# Patient Record
Sex: MALE | Race: WHITE | Employment: FULL TIME | URBAN - METROPOLITAN AREA
[De-identification: names, ages, dates, MRNs, and addresses within clinical notes are randomized per-mention and may not be internally consistent; named-entity substitution may affect disease eponyms.]

---

## 2017-01-04 ENCOUNTER — TELEPHONE (OUTPATIENT)
Dept: ORTHOPEDIC SURGERY | Age: 37
End: 2017-01-04

## 2017-01-05 RX ORDER — DEXTROAMPHETAMINE SACCHARATE, AMPHETAMINE ASPARTATE, DEXTROAMPHETAMINE SULFATE AND AMPHETAMINE SULFATE 7.5; 7.5; 7.5; 7.5 MG/1; MG/1; MG/1; MG/1
30 TABLET ORAL 2 TIMES DAILY
Qty: 60 TABLET | Refills: 0 | Status: SHIPPED | OUTPATIENT
Start: 2017-01-05 | End: 2017-02-16 | Stop reason: SDUPTHER

## 2017-01-09 ENCOUNTER — TELEPHONE (OUTPATIENT)
Dept: FAMILY MEDICINE CLINIC | Age: 37
End: 2017-01-09

## 2017-01-11 ENCOUNTER — TELEPHONE (OUTPATIENT)
Dept: ORTHOPEDIC SURGERY | Age: 37
End: 2017-01-11

## 2017-01-12 ENCOUNTER — OFFICE VISIT (OUTPATIENT)
Dept: FAMILY MEDICINE CLINIC | Age: 37
End: 2017-01-12

## 2017-01-12 VITALS
HEART RATE: 91 BPM | WEIGHT: 194 LBS | OXYGEN SATURATION: 98 % | BODY MASS INDEX: 28.73 KG/M2 | HEIGHT: 69 IN | DIASTOLIC BLOOD PRESSURE: 82 MMHG | SYSTOLIC BLOOD PRESSURE: 122 MMHG

## 2017-01-12 DIAGNOSIS — Z01.818 PREOPERATIVE CLEARANCE: Primary | ICD-10-CM

## 2017-01-12 PROCEDURE — 99244 OFF/OP CNSLTJ NEW/EST MOD 40: CPT | Performed by: FAMILY MEDICINE

## 2017-01-16 ENCOUNTER — TELEPHONE (OUTPATIENT)
Dept: FAMILY MEDICINE CLINIC | Age: 37
End: 2017-01-16

## 2017-01-16 RX ORDER — DIAZEPAM 10 MG/1
TABLET ORAL
Qty: 2 TABLET | Refills: 0 | Status: SHIPPED | OUTPATIENT
Start: 2017-01-16 | End: 2017-05-16 | Stop reason: ALTCHOICE

## 2017-01-17 ENCOUNTER — HOSPITAL ENCOUNTER (OUTPATIENT)
Dept: SURGERY | Age: 37
Discharge: HOME | End: 2017-01-17
Attending: ORTHOPAEDIC SURGERY | Admitting: ORTHOPAEDIC SURGERY

## 2017-01-17 VITALS
HEART RATE: 79 BPM | TEMPERATURE: 97.4 F | OXYGEN SATURATION: 100 % | RESPIRATION RATE: 19 BRPM | DIASTOLIC BLOOD PRESSURE: 98 MMHG | SYSTOLIC BLOOD PRESSURE: 145 MMHG

## 2017-01-17 RX ORDER — HYDRALAZINE HYDROCHLORIDE 20 MG/ML
5 INJECTION INTRAMUSCULAR; INTRAVENOUS EVERY 10 MIN PRN
Status: DISCONTINUED | OUTPATIENT
Start: 2017-01-17 | End: 2017-01-18 | Stop reason: HOSPADM

## 2017-01-17 RX ORDER — TRAMADOL HYDROCHLORIDE 50 MG/1
50 TABLET ORAL EVERY 6 HOURS PRN
Qty: 40 TABLET | Refills: 0 | Status: SHIPPED | OUTPATIENT
Start: 2017-01-17 | End: 2017-01-27

## 2017-01-17 RX ORDER — OXYCODONE AND ACETAMINOPHEN 7.5; 325 MG/1; MG/1
TABLET ORAL
Qty: 40 TABLET | Refills: 0 | Status: SHIPPED | OUTPATIENT
Start: 2017-01-17 | End: 2017-01-17

## 2017-01-17 RX ORDER — OXYCODONE HYDROCHLORIDE AND ACETAMINOPHEN 5; 325 MG/1; MG/1
1 TABLET ORAL PRN
Status: COMPLETED | OUTPATIENT
Start: 2017-01-17 | End: 2017-01-17

## 2017-01-17 RX ORDER — MIDAZOLAM HYDROCHLORIDE 1 MG/ML
INJECTION INTRAMUSCULAR; INTRAVENOUS
Status: DISPENSED
Start: 2017-01-17 | End: 2017-01-17

## 2017-01-17 RX ORDER — OXYCODONE HYDROCHLORIDE AND ACETAMINOPHEN 5; 325 MG/1; MG/1
2 TABLET ORAL PRN
Status: COMPLETED | OUTPATIENT
Start: 2017-01-17 | End: 2017-01-17

## 2017-01-17 RX ORDER — OXYCODONE AND ACETAMINOPHEN 7.5; 325 MG/1; MG/1
TABLET ORAL
Qty: 40 TABLET | Refills: 0 | Status: SHIPPED | OUTPATIENT
Start: 2017-01-17 | End: 2017-01-30 | Stop reason: SDUPTHER

## 2017-01-17 RX ORDER — ONDANSETRON 2 MG/ML
4 INJECTION INTRAMUSCULAR; INTRAVENOUS EVERY 10 MIN PRN
Status: DISCONTINUED | OUTPATIENT
Start: 2017-01-17 | End: 2017-01-18 | Stop reason: HOSPADM

## 2017-01-17 RX ORDER — LABETALOL HYDROCHLORIDE 5 MG/ML
5 INJECTION, SOLUTION INTRAVENOUS EVERY 10 MIN PRN
Status: DISCONTINUED | OUTPATIENT
Start: 2017-01-17 | End: 2017-01-18 | Stop reason: HOSPADM

## 2017-01-17 RX ORDER — SODIUM CHLORIDE, SODIUM LACTATE, POTASSIUM CHLORIDE, CALCIUM CHLORIDE 600; 310; 30; 20 MG/100ML; MG/100ML; MG/100ML; MG/100ML
INJECTION, SOLUTION INTRAVENOUS CONTINUOUS
Status: DISCONTINUED | OUTPATIENT
Start: 2017-01-17 | End: 2017-01-18 | Stop reason: HOSPADM

## 2017-01-17 RX ORDER — MEPERIDINE HYDROCHLORIDE 50 MG/ML
12.5 INJECTION INTRAMUSCULAR; INTRAVENOUS; SUBCUTANEOUS EVERY 5 MIN PRN
Status: DISCONTINUED | OUTPATIENT
Start: 2017-01-17 | End: 2017-01-18 | Stop reason: HOSPADM

## 2017-01-17 RX ADMIN — OXYCODONE HYDROCHLORIDE AND ACETAMINOPHEN 1 TABLET: 5; 325 TABLET ORAL at 12:39

## 2017-01-17 RX ADMIN — SODIUM CHLORIDE, SODIUM LACTATE, POTASSIUM CHLORIDE, CALCIUM CHLORIDE: 600; 310; 30; 20 INJECTION, SOLUTION INTRAVENOUS at 09:21

## 2017-01-17 ASSESSMENT — PAIN SCALES - GENERAL
PAINLEVEL_OUTOF10: 0
PAINLEVEL_OUTOF10: 4
PAINLEVEL_OUTOF10: 0

## 2017-01-20 ENCOUNTER — OFFICE VISIT (OUTPATIENT)
Dept: ORTHOPEDIC SURGERY | Age: 37
End: 2017-01-20

## 2017-01-20 VITALS — WEIGHT: 194 LBS | BODY MASS INDEX: 28.73 KG/M2 | HEIGHT: 69 IN

## 2017-01-20 DIAGNOSIS — Z98.890 HISTORY OF ARTHROSCOPY OF LEFT SHOULDER: Primary | ICD-10-CM

## 2017-01-20 PROCEDURE — 99024 POSTOP FOLLOW-UP VISIT: CPT | Performed by: ORTHOPAEDIC SURGERY

## 2017-01-30 RX ORDER — OXYCODONE AND ACETAMINOPHEN 7.5; 325 MG/1; MG/1
TABLET ORAL
Qty: 40 TABLET | Refills: 0 | Status: SHIPPED | OUTPATIENT
Start: 2017-01-30 | End: 2017-05-16 | Stop reason: ALTCHOICE

## 2017-02-03 ENCOUNTER — OFFICE VISIT (OUTPATIENT)
Dept: ORTHOPEDIC SURGERY | Age: 37
End: 2017-02-03

## 2017-02-03 DIAGNOSIS — S43.432A LABRAL TEAR OF SHOULDER, LEFT, INITIAL ENCOUNTER: Primary | ICD-10-CM

## 2017-02-03 DIAGNOSIS — Z98.890 STATUS POST SHOULDER SURGERY: ICD-10-CM

## 2017-02-03 DIAGNOSIS — M19.019 ACROMIOCLAVICULAR JOINT ARTHRITIS: ICD-10-CM

## 2017-02-03 PROCEDURE — 99024 POSTOP FOLLOW-UP VISIT: CPT | Performed by: ORTHOPAEDIC SURGERY

## 2017-02-07 ENCOUNTER — HOSPITAL ENCOUNTER (OUTPATIENT)
Dept: PHYSICAL THERAPY | Age: 37
Discharge: HOME | End: 2017-02-28
Admitting: ORTHOPAEDIC SURGERY

## 2017-02-09 ENCOUNTER — HOSPITAL ENCOUNTER (OUTPATIENT)
Dept: PHYSICAL THERAPY | Age: 37
Discharge: HOME | End: 2017-02-09
Admitting: ORTHOPAEDIC SURGERY

## 2017-02-13 ENCOUNTER — HOSPITAL ENCOUNTER (OUTPATIENT)
Dept: PHYSICAL THERAPY | Age: 37
Discharge: HOME | End: 2017-02-13
Admitting: ORTHOPAEDIC SURGERY

## 2017-02-16 ENCOUNTER — HOSPITAL ENCOUNTER (OUTPATIENT)
Dept: PHYSICAL THERAPY | Age: 37
Discharge: HOME | End: 2017-02-16
Admitting: ORTHOPAEDIC SURGERY

## 2017-02-16 RX ORDER — DEXTROAMPHETAMINE SACCHARATE, AMPHETAMINE ASPARTATE, DEXTROAMPHETAMINE SULFATE AND AMPHETAMINE SULFATE 7.5; 7.5; 7.5; 7.5 MG/1; MG/1; MG/1; MG/1
30 TABLET ORAL 2 TIMES DAILY
Qty: 60 TABLET | Refills: 0 | Status: SHIPPED | OUTPATIENT
Start: 2017-02-16 | End: 2017-02-21 | Stop reason: SDUPTHER

## 2017-02-17 ENCOUNTER — TELEPHONE (OUTPATIENT)
Dept: FAMILY MEDICINE CLINIC | Age: 37
End: 2017-02-17

## 2017-02-21 ENCOUNTER — TELEPHONE (OUTPATIENT)
Dept: FAMILY MEDICINE CLINIC | Age: 37
End: 2017-02-21

## 2017-02-21 RX ORDER — DEXTROAMPHETAMINE SACCHARATE, AMPHETAMINE ASPARTATE, DEXTROAMPHETAMINE SULFATE AND AMPHETAMINE SULFATE 7.5; 7.5; 7.5; 7.5 MG/1; MG/1; MG/1; MG/1
30 TABLET ORAL 2 TIMES DAILY
Qty: 60 TABLET | Refills: 0 | Status: SHIPPED | OUTPATIENT
Start: 2017-02-21 | End: 2017-03-22 | Stop reason: SDUPTHER

## 2017-02-23 ENCOUNTER — HOSPITAL ENCOUNTER (OUTPATIENT)
Dept: PHYSICAL THERAPY | Age: 37
Discharge: HOME | End: 2017-02-23
Admitting: ORTHOPAEDIC SURGERY

## 2017-02-24 ENCOUNTER — OFFICE VISIT (OUTPATIENT)
Dept: ORTHOPEDIC SURGERY | Age: 37
End: 2017-02-24

## 2017-02-24 DIAGNOSIS — M25.512 PAIN, JOINT, SHOULDER, LEFT: Primary | ICD-10-CM

## 2017-02-24 DIAGNOSIS — S43.432A LABRAL TEAR OF SHOULDER, LEFT, INITIAL ENCOUNTER: ICD-10-CM

## 2017-02-24 PROCEDURE — 99024 POSTOP FOLLOW-UP VISIT: CPT | Performed by: ORTHOPAEDIC SURGERY

## 2017-02-24 PROCEDURE — 73030 X-RAY EXAM OF SHOULDER: CPT | Performed by: ORTHOPAEDIC SURGERY

## 2017-03-07 ENCOUNTER — HOSPITAL ENCOUNTER (OUTPATIENT)
Dept: PHYSICAL THERAPY | Age: 37
Discharge: HOME | End: 2017-03-07
Admitting: ORTHOPAEDIC SURGERY

## 2017-03-23 ENCOUNTER — HOSPITAL ENCOUNTER (OUTPATIENT)
Dept: PHYSICAL THERAPY | Age: 37
Discharge: HOME | End: 2017-03-23
Admitting: ORTHOPAEDIC SURGERY

## 2017-03-24 ENCOUNTER — TELEPHONE (OUTPATIENT)
Dept: FAMILY MEDICINE CLINIC | Age: 37
End: 2017-03-24

## 2017-03-30 RX ORDER — DEXTROAMPHETAMINE SACCHARATE, AMPHETAMINE ASPARTATE, DEXTROAMPHETAMINE SULFATE AND AMPHETAMINE SULFATE 7.5; 7.5; 7.5; 7.5 MG/1; MG/1; MG/1; MG/1
30 TABLET ORAL 2 TIMES DAILY
Qty: 60 TABLET | Refills: 0 | Status: SHIPPED | OUTPATIENT
Start: 2017-03-30 | End: 2017-05-16 | Stop reason: SDUPTHER

## 2017-04-14 ENCOUNTER — OFFICE VISIT (OUTPATIENT)
Dept: ORTHOPEDIC SURGERY | Age: 37
End: 2017-04-14

## 2017-04-14 VITALS — BODY MASS INDEX: 28.73 KG/M2 | WEIGHT: 194 LBS | HEIGHT: 69 IN

## 2017-04-14 DIAGNOSIS — S43.432A LABRAL TEAR OF SHOULDER, LEFT, INITIAL ENCOUNTER: Primary | ICD-10-CM

## 2017-04-14 DIAGNOSIS — Z98.890 STATUS POST ARTHROSCOPY OF SHOULDER: ICD-10-CM

## 2017-04-14 PROCEDURE — 99024 POSTOP FOLLOW-UP VISIT: CPT | Performed by: ORTHOPAEDIC SURGERY

## 2017-04-27 ENCOUNTER — TELEPHONE (OUTPATIENT)
Dept: FAMILY MEDICINE CLINIC | Age: 37
End: 2017-04-27

## 2017-05-16 ENCOUNTER — OFFICE VISIT (OUTPATIENT)
Dept: INTERNAL MEDICINE CLINIC | Age: 37
End: 2017-05-16

## 2017-05-16 VITALS
WEIGHT: 191 LBS | BODY MASS INDEX: 28.95 KG/M2 | SYSTOLIC BLOOD PRESSURE: 134 MMHG | HEART RATE: 78 BPM | DIASTOLIC BLOOD PRESSURE: 86 MMHG | RESPIRATION RATE: 18 BRPM | HEIGHT: 68 IN

## 2017-05-16 DIAGNOSIS — Z79.899 CONTROLLED SUBSTANCE AGREEMENT SIGNED: ICD-10-CM

## 2017-05-16 DIAGNOSIS — H40.053 INCREASED INTRAOCULAR PRESSURE, BILATERAL: ICD-10-CM

## 2017-05-16 DIAGNOSIS — A60.00 RECURRENT GENITAL HSV (HERPES SIMPLEX VIRUS) INFECTION: ICD-10-CM

## 2017-05-16 DIAGNOSIS — Z00.00 ANNUAL PHYSICAL EXAM: Primary | ICD-10-CM

## 2017-05-16 DIAGNOSIS — F90.9 ATTENTION DEFICIT HYPERACTIVITY DISORDER (ADHD), UNSPECIFIED ADHD TYPE: ICD-10-CM

## 2017-05-16 PROCEDURE — 99203 OFFICE O/P NEW LOW 30 MIN: CPT | Performed by: INTERNAL MEDICINE

## 2017-05-16 PROCEDURE — 99385 PREV VISIT NEW AGE 18-39: CPT | Performed by: INTERNAL MEDICINE

## 2017-05-16 RX ORDER — DEXTROAMPHETAMINE SACCHARATE, AMPHETAMINE ASPARTATE, DEXTROAMPHETAMINE SULFATE AND AMPHETAMINE SULFATE 7.5; 7.5; 7.5; 7.5 MG/1; MG/1; MG/1; MG/1
30 TABLET ORAL 2 TIMES DAILY
Qty: 60 TABLET | Refills: 0 | Status: SHIPPED | OUTPATIENT
Start: 2017-06-16 | End: 2017-08-15 | Stop reason: SDUPTHER

## 2017-05-16 RX ORDER — DEXTROAMPHETAMINE SACCHARATE, AMPHETAMINE ASPARTATE, DEXTROAMPHETAMINE SULFATE AND AMPHETAMINE SULFATE 7.5; 7.5; 7.5; 7.5 MG/1; MG/1; MG/1; MG/1
30 TABLET ORAL 2 TIMES DAILY
Qty: 60 TABLET | Refills: 0 | Status: SHIPPED | OUTPATIENT
Start: 2017-05-16 | End: 2017-08-15 | Stop reason: SDUPTHER

## 2017-05-16 RX ORDER — DEXTROAMPHETAMINE SACCHARATE, AMPHETAMINE ASPARTATE, DEXTROAMPHETAMINE SULFATE AND AMPHETAMINE SULFATE 7.5; 7.5; 7.5; 7.5 MG/1; MG/1; MG/1; MG/1
30 TABLET ORAL 2 TIMES DAILY
Qty: 60 TABLET | Refills: 0 | Status: SHIPPED | OUTPATIENT
Start: 2017-07-16 | End: 2017-08-15 | Stop reason: SDUPTHER

## 2017-06-05 ENCOUNTER — OFFICE VISIT (OUTPATIENT)
Dept: ORTHOPEDIC SURGERY | Age: 37
End: 2017-06-05

## 2017-06-05 VITALS — WEIGHT: 190.92 LBS | BODY MASS INDEX: 28.94 KG/M2 | HEIGHT: 68 IN

## 2017-06-05 DIAGNOSIS — M76.891 HIP ABDUCTOR TENDINITIS, RIGHT: ICD-10-CM

## 2017-06-05 DIAGNOSIS — M25.551 RIGHT HIP PAIN: Primary | ICD-10-CM

## 2017-06-05 PROCEDURE — 99213 OFFICE O/P EST LOW 20 MIN: CPT | Performed by: ORTHOPAEDIC SURGERY

## 2017-06-05 PROCEDURE — 73502 X-RAY EXAM HIP UNI 2-3 VIEWS: CPT | Performed by: ORTHOPAEDIC SURGERY

## 2017-07-21 ENCOUNTER — HOSPITAL ENCOUNTER (OUTPATIENT)
Dept: GENERAL RADIOLOGY | Age: 37
Discharge: HOME | End: 2017-07-21

## 2017-07-21 LAB
A/G RATIO: 1.8 (ref 1.1–2.2)
ALBUMIN SERPL-MCNC: 4.6 G/DL (ref 3.4–5)
ALP BLD-CCNC: 78 U/L (ref 40–129)
ALT SERPL-CCNC: 25 U/L (ref 10–40)
ANION GAP SERPL CALCULATED.3IONS-SCNC: 13 MMOL/L (ref 3–16)
AST SERPL-CCNC: 27 U/L (ref 15–37)
BASOPHILS ABSOLUTE: 0 K/UL (ref 0–0.2)
BASOPHILS RELATIVE PERCENT: 0.6 %
BILIRUB SERPL-MCNC: 0.6 MG/DL (ref 0–1)
BUN BLDV-MCNC: 18 MG/DL (ref 7–20)
CALCIUM SERPL-MCNC: 9.6 MG/DL (ref 8.3–10.6)
CHLORIDE BLD-SCNC: 103 MMOL/L (ref 99–110)
CHOLESTEROL, TOTAL: 125 MG/DL (ref 0–199)
CO2: 26 MMOL/L (ref 21–32)
CREAT SERPL-MCNC: 1.2 MG/DL (ref 0.9–1.3)
EOSINOPHILS ABSOLUTE: 0.1 K/UL (ref 0–0.6)
EOSINOPHILS RELATIVE PERCENT: 2 %
GFR AFRICAN AMERICAN: >60
GFR NON-AFRICAN AMERICAN: >60
GLOBULIN: 2.5 G/DL
GLUCOSE BLD-MCNC: 76 MG/DL (ref 70–99)
HCT VFR BLD CALC: 46.8 % (ref 40.5–52.5)
HDLC SERPL-MCNC: 37 MG/DL (ref 40–60)
HEMOGLOBIN: 16 G/DL (ref 13.5–17.5)
LDL CHOLESTEROL CALCULATED: 73 MG/DL
LYMPHOCYTES ABSOLUTE: 1.3 K/UL (ref 1–5.1)
LYMPHOCYTES RELATIVE PERCENT: 28.8 %
MCH RBC QN AUTO: 32.5 PG (ref 26–34)
MCHC RBC AUTO-ENTMCNC: 34.3 G/DL (ref 31–36)
MCV RBC AUTO: 94.7 FL (ref 80–100)
MONOCYTES ABSOLUTE: 0.4 K/UL (ref 0–1.3)
MONOCYTES RELATIVE PERCENT: 9.1 %
NEUTROPHILS ABSOLUTE: 2.7 K/UL (ref 1.7–7.7)
NEUTROPHILS RELATIVE PERCENT: 59.5 %
PDW BLD-RTO: 13.5 % (ref 12.4–15.4)
PLATELET # BLD: 189 K/UL (ref 135–450)
PMV BLD AUTO: 9.1 FL (ref 5–10.5)
POTASSIUM SERPL-SCNC: 4.7 MMOL/L (ref 3.5–5.1)
RBC # BLD: 4.94 M/UL (ref 4.2–5.9)
SODIUM BLD-SCNC: 142 MMOL/L (ref 136–145)
TOTAL PROTEIN: 7.1 G/DL (ref 6.4–8.2)
TRIGL SERPL-MCNC: 77 MG/DL (ref 0–150)
VLDLC SERPL CALC-MCNC: 15 MG/DL
WBC # BLD: 4.6 K/UL (ref 4–11)

## 2017-08-15 ENCOUNTER — OFFICE VISIT (OUTPATIENT)
Dept: INTERNAL MEDICINE CLINIC | Age: 37
End: 2017-08-15

## 2017-08-15 VITALS
WEIGHT: 184 LBS | BODY MASS INDEX: 27.89 KG/M2 | SYSTOLIC BLOOD PRESSURE: 115 MMHG | RESPIRATION RATE: 16 BRPM | HEART RATE: 78 BPM | HEIGHT: 68 IN | DIASTOLIC BLOOD PRESSURE: 80 MMHG

## 2017-08-15 DIAGNOSIS — Z00.00 ANNUAL PHYSICAL EXAM: ICD-10-CM

## 2017-08-15 DIAGNOSIS — F90.9 ATTENTION DEFICIT HYPERACTIVITY DISORDER (ADHD), UNSPECIFIED ADHD TYPE: ICD-10-CM

## 2017-08-15 DIAGNOSIS — R53.83 FATIGUE, UNSPECIFIED TYPE: Primary | ICD-10-CM

## 2017-08-15 DIAGNOSIS — H40.053 INCREASED INTRAOCULAR PRESSURE, BILATERAL: ICD-10-CM

## 2017-08-15 LAB — TSH REFLEX: 1.4 UIU/ML (ref 0.27–4.2)

## 2017-08-15 PROCEDURE — 36415 COLL VENOUS BLD VENIPUNCTURE: CPT | Performed by: INTERNAL MEDICINE

## 2017-08-15 PROCEDURE — 99214 OFFICE O/P EST MOD 30 MIN: CPT | Performed by: INTERNAL MEDICINE

## 2017-08-15 PROCEDURE — 93000 ELECTROCARDIOGRAM COMPLETE: CPT | Performed by: INTERNAL MEDICINE

## 2017-08-15 RX ORDER — DEXTROAMPHETAMINE SACCHARATE, AMPHETAMINE ASPARTATE, DEXTROAMPHETAMINE SULFATE AND AMPHETAMINE SULFATE 7.5; 7.5; 7.5; 7.5 MG/1; MG/1; MG/1; MG/1
30 TABLET ORAL 2 TIMES DAILY
Qty: 60 TABLET | Refills: 0 | Status: SHIPPED | OUTPATIENT
Start: 2017-10-16 | End: 2017-11-20 | Stop reason: SDUPTHER

## 2017-08-15 RX ORDER — DEXTROAMPHETAMINE SACCHARATE, AMPHETAMINE ASPARTATE, DEXTROAMPHETAMINE SULFATE AND AMPHETAMINE SULFATE 7.5; 7.5; 7.5; 7.5 MG/1; MG/1; MG/1; MG/1
30 TABLET ORAL 2 TIMES DAILY
Qty: 60 TABLET | Refills: 0 | Status: SHIPPED | OUTPATIENT
Start: 2017-08-15 | End: 2017-11-20 | Stop reason: SDUPTHER

## 2017-08-15 RX ORDER — DEXTROAMPHETAMINE SACCHARATE, AMPHETAMINE ASPARTATE, DEXTROAMPHETAMINE SULFATE AND AMPHETAMINE SULFATE 7.5; 7.5; 7.5; 7.5 MG/1; MG/1; MG/1; MG/1
30 TABLET ORAL 2 TIMES DAILY
Qty: 60 TABLET | Refills: 0 | Status: SHIPPED | OUTPATIENT
Start: 2017-09-16 | End: 2017-11-20 | Stop reason: SDUPTHER

## 2017-08-15 RX ORDER — LATANOPROST 50 UG/ML
1 SOLUTION/ DROPS OPHTHALMIC NIGHTLY
Qty: 1 BOTTLE | Refills: 0 | Status: SHIPPED | OUTPATIENT
Start: 2017-08-15 | End: 2018-11-15 | Stop reason: ALTCHOICE

## 2017-08-17 DIAGNOSIS — E29.1 HYPOGONADISM IN MALE: ICD-10-CM

## 2017-08-17 LAB — TESTOSTERONE TOTAL: 262 NG/DL (ref 220–1000)

## 2017-08-31 ENCOUNTER — HOSPITAL ENCOUNTER (OUTPATIENT)
Dept: GENERAL RADIOLOGY | Age: 37
Discharge: HOME | End: 2017-08-31

## 2017-09-02 LAB
SEX HORMONE BINDING GLOBULIN: 14 NMOL/L (ref 11–80)
TESTOSTERONE FREE PERCENT: 2.5 % (ref 1.6–2.9)
TESTOSTERONE FREE, CALC: 59 PG/ML (ref 47–244)
TESTOSTERONE TOTAL-MALE: 238 NG/DL (ref 300–1080)
TESTOSTERONE, BIOAVAILABLE: 169 NG/DL (ref 131–682)

## 2017-09-05 DIAGNOSIS — E29.1 HYPOGONADISM IN MALE: Primary | ICD-10-CM

## 2017-09-05 RX ORDER — TESTOSTERONE 16.2 MG/G
2 GEL TRANSDERMAL DAILY
Qty: 1 BOTTLE | Refills: 0 | Status: SHIPPED | OUTPATIENT
Start: 2017-09-05 | End: 2018-02-26 | Stop reason: ALTCHOICE

## 2017-11-20 ENCOUNTER — OFFICE VISIT (OUTPATIENT)
Dept: INTERNAL MEDICINE CLINIC | Age: 37
End: 2017-11-20

## 2017-11-20 VITALS
DIASTOLIC BLOOD PRESSURE: 82 MMHG | RESPIRATION RATE: 16 BRPM | BODY MASS INDEX: 29.7 KG/M2 | HEART RATE: 80 BPM | WEIGHT: 196 LBS | HEIGHT: 68 IN | SYSTOLIC BLOOD PRESSURE: 130 MMHG

## 2017-11-20 DIAGNOSIS — F98.8 ATTENTION DEFICIT DISORDER (ADD) WITHOUT HYPERACTIVITY: ICD-10-CM

## 2017-11-20 PROCEDURE — G8484 FLU IMMUNIZE NO ADMIN: HCPCS | Performed by: INTERNAL MEDICINE

## 2017-11-20 PROCEDURE — 1036F TOBACCO NON-USER: CPT | Performed by: INTERNAL MEDICINE

## 2017-11-20 PROCEDURE — G8427 DOCREV CUR MEDS BY ELIG CLIN: HCPCS | Performed by: INTERNAL MEDICINE

## 2017-11-20 PROCEDURE — G8417 CALC BMI ABV UP PARAM F/U: HCPCS | Performed by: INTERNAL MEDICINE

## 2017-11-20 PROCEDURE — 99213 OFFICE O/P EST LOW 20 MIN: CPT | Performed by: INTERNAL MEDICINE

## 2017-11-20 RX ORDER — DEXTROAMPHETAMINE SACCHARATE, AMPHETAMINE ASPARTATE, DEXTROAMPHETAMINE SULFATE AND AMPHETAMINE SULFATE 7.5; 7.5; 7.5; 7.5 MG/1; MG/1; MG/1; MG/1
30 TABLET ORAL 2 TIMES DAILY
Qty: 60 TABLET | Refills: 0 | Status: SHIPPED | OUTPATIENT
Start: 2017-12-20 | End: 2018-02-26 | Stop reason: SDUPTHER

## 2017-11-20 RX ORDER — DEXTROAMPHETAMINE SACCHARATE, AMPHETAMINE ASPARTATE, DEXTROAMPHETAMINE SULFATE AND AMPHETAMINE SULFATE 7.5; 7.5; 7.5; 7.5 MG/1; MG/1; MG/1; MG/1
30 TABLET ORAL 2 TIMES DAILY
Qty: 60 TABLET | Refills: 0 | Status: SHIPPED | OUTPATIENT
Start: 2017-11-20 | End: 2018-02-26 | Stop reason: SDUPTHER

## 2017-11-20 RX ORDER — DEXTROAMPHETAMINE SACCHARATE, AMPHETAMINE ASPARTATE, DEXTROAMPHETAMINE SULFATE AND AMPHETAMINE SULFATE 7.5; 7.5; 7.5; 7.5 MG/1; MG/1; MG/1; MG/1
30 TABLET ORAL 2 TIMES DAILY
Qty: 60 TABLET | Refills: 0 | Status: SHIPPED | OUTPATIENT
Start: 2018-01-20 | End: 2018-02-26 | Stop reason: SDUPTHER

## 2018-01-17 ENCOUNTER — OFFICE VISIT (OUTPATIENT)
Dept: INTERNAL MEDICINE CLINIC | Age: 38
End: 2018-01-17

## 2018-01-17 VITALS
HEIGHT: 68 IN | BODY MASS INDEX: 28.95 KG/M2 | HEART RATE: 82 BPM | DIASTOLIC BLOOD PRESSURE: 84 MMHG | RESPIRATION RATE: 18 BRPM | TEMPERATURE: 99.9 F | SYSTOLIC BLOOD PRESSURE: 140 MMHG | WEIGHT: 191 LBS

## 2018-01-17 DIAGNOSIS — J40 BRONCHITIS: Primary | ICD-10-CM

## 2018-01-17 DIAGNOSIS — J10.1 INFLUENZA B: ICD-10-CM

## 2018-01-17 DIAGNOSIS — R50.9 FEVER AND CHILLS: ICD-10-CM

## 2018-01-17 DIAGNOSIS — M79.10 MYALGIA: ICD-10-CM

## 2018-01-17 LAB
INFLUENZA A ANTIBODY: NEGATIVE
INFLUENZA B ANTIBODY: POSITIVE

## 2018-01-17 PROCEDURE — G8484 FLU IMMUNIZE NO ADMIN: HCPCS | Performed by: INTERNAL MEDICINE

## 2018-01-17 PROCEDURE — 87804 INFLUENZA ASSAY W/OPTIC: CPT | Performed by: INTERNAL MEDICINE

## 2018-01-17 PROCEDURE — G8427 DOCREV CUR MEDS BY ELIG CLIN: HCPCS | Performed by: INTERNAL MEDICINE

## 2018-01-17 PROCEDURE — 99213 OFFICE O/P EST LOW 20 MIN: CPT | Performed by: INTERNAL MEDICINE

## 2018-01-17 PROCEDURE — 1036F TOBACCO NON-USER: CPT | Performed by: INTERNAL MEDICINE

## 2018-01-17 PROCEDURE — G8417 CALC BMI ABV UP PARAM F/U: HCPCS | Performed by: INTERNAL MEDICINE

## 2018-01-17 RX ORDER — LEVOFLOXACIN 500 MG/1
500 TABLET, FILM COATED ORAL DAILY
Qty: 10 TABLET | Refills: 0 | Status: SHIPPED | OUTPATIENT
Start: 2018-01-17 | End: 2018-01-27

## 2018-01-17 RX ORDER — OSELTAMIVIR PHOSPHATE 75 MG/1
75 CAPSULE ORAL 2 TIMES DAILY
Qty: 10 CAPSULE | Refills: 0 | Status: SHIPPED | OUTPATIENT
Start: 2018-01-17 | End: 2018-01-22

## 2018-01-29 ENCOUNTER — TELEPHONE (OUTPATIENT)
Dept: INTERNAL MEDICINE CLINIC | Age: 38
End: 2018-01-29

## 2018-02-26 ENCOUNTER — OFFICE VISIT (OUTPATIENT)
Dept: INTERNAL MEDICINE CLINIC | Age: 38
End: 2018-02-26

## 2018-02-26 VITALS
BODY MASS INDEX: 29.7 KG/M2 | WEIGHT: 196 LBS | DIASTOLIC BLOOD PRESSURE: 80 MMHG | HEIGHT: 68 IN | SYSTOLIC BLOOD PRESSURE: 132 MMHG | HEART RATE: 68 BPM | RESPIRATION RATE: 18 BRPM

## 2018-02-26 DIAGNOSIS — F98.8 ATTENTION DEFICIT DISORDER (ADD) WITHOUT HYPERACTIVITY: ICD-10-CM

## 2018-02-26 PROCEDURE — G8484 FLU IMMUNIZE NO ADMIN: HCPCS | Performed by: INTERNAL MEDICINE

## 2018-02-26 PROCEDURE — 1036F TOBACCO NON-USER: CPT | Performed by: INTERNAL MEDICINE

## 2018-02-26 PROCEDURE — 99213 OFFICE O/P EST LOW 20 MIN: CPT | Performed by: INTERNAL MEDICINE

## 2018-02-26 PROCEDURE — G8427 DOCREV CUR MEDS BY ELIG CLIN: HCPCS | Performed by: INTERNAL MEDICINE

## 2018-02-26 PROCEDURE — G8417 CALC BMI ABV UP PARAM F/U: HCPCS | Performed by: INTERNAL MEDICINE

## 2018-02-26 RX ORDER — DEXTROAMPHETAMINE SACCHARATE, AMPHETAMINE ASPARTATE, DEXTROAMPHETAMINE SULFATE AND AMPHETAMINE SULFATE 7.5; 7.5; 7.5; 7.5 MG/1; MG/1; MG/1; MG/1
30 TABLET ORAL 2 TIMES DAILY
Qty: 60 TABLET | Refills: 0 | Status: SHIPPED | OUTPATIENT
Start: 2018-03-26 | End: 2018-05-31 | Stop reason: SDUPTHER

## 2018-02-26 RX ORDER — DEXTROAMPHETAMINE SACCHARATE, AMPHETAMINE ASPARTATE, DEXTROAMPHETAMINE SULFATE AND AMPHETAMINE SULFATE 7.5; 7.5; 7.5; 7.5 MG/1; MG/1; MG/1; MG/1
30 TABLET ORAL 2 TIMES DAILY
Qty: 60 TABLET | Refills: 0 | Status: SHIPPED | OUTPATIENT
Start: 2018-02-26 | End: 2018-05-31 | Stop reason: SDUPTHER

## 2018-02-26 RX ORDER — DEXTROAMPHETAMINE SACCHARATE, AMPHETAMINE ASPARTATE, DEXTROAMPHETAMINE SULFATE AND AMPHETAMINE SULFATE 7.5; 7.5; 7.5; 7.5 MG/1; MG/1; MG/1; MG/1
30 TABLET ORAL 2 TIMES DAILY
Qty: 60 TABLET | Refills: 0 | Status: SHIPPED | OUTPATIENT
Start: 2018-04-26 | End: 2018-05-31 | Stop reason: SDUPTHER

## 2018-02-26 NOTE — PROGRESS NOTES
Nahomy Tobin  YOB: 1980    Date of Service:  2/26/2018    Chief Complaint:      Chief Complaint   Patient presents with    3 Month Follow-Up      ADD       HPI:  Giovana Koo is a 41 y.o. ADD:  Stable on Adderall 30 mg bid days 5-7 days a week.  No change in appetite or sleep disturbance. No results found for: Gonsalo Contreras  Lab Results   Component Value Date     07/21/2017    K 4.7 07/21/2017     07/21/2017    CO2 26 07/21/2017    BUN 18 07/21/2017    CREATININE 1.2 07/21/2017    GLUCOSE 76 07/21/2017    CALCIUM 9.6 07/21/2017     Lab Results   Component Value Date    CHOL 125 07/21/2017    TRIG 77 07/21/2017    HDL 37 07/21/2017    LDLCALC 73 07/21/2017     Lab Results   Component Value Date    ALT 25 07/21/2017    AST 27 07/21/2017     Lab Results   Component Value Date    TSH 1.00 12/11/2013    T4FREE 1.1 12/11/2013     Lab Results   Component Value Date    WBC 4.6 07/21/2017    HGB 16.0 07/21/2017    HCT 46.8 07/21/2017    MCV 94.7 07/21/2017     07/21/2017     No results found for: INR   No results found for: PSA   No results found for: [address]     Patient Active Problem List   Diagnosis    Increased intraocular pressure    Attention deficit disorder (ADD) without hyperactivity    Recurrent genital HSV (herpes simplex virus) infection    Controlled substance agreement signed    Hypogonadism in male       No Known Allergies  Outpatient Prescriptions Marked as Taking for the 2/26/18 encounter (Office Visit) with Maury Engel MD   Medication Sig Dispense Refill    amphetamine-dextroamphetamine (ADDERALL) 30 MG tablet Take 1 tablet by mouth 2 times daily . 60 tablet 0    amphetamine-dextroamphetamine (ADDERALL, 30MG,) 30 MG tablet Take 1 tablet by mouth 2 times daily . 60 tablet 0    amphetamine-dextroamphetamine (ADDERALL, 30MG,) 30 MG tablet Take 1 tablet by mouth 2 times daily .  60 tablet 0    latanoprost (XALATAN) 0.005 % ophthalmic solution Place 1 drop into both eyes nightly 1 Bottle 0    acyclovir (ZOVIRAX) 400 MG tablet TAKE 1 TABLET BY MOUTH TWICE DAILY (Patient taking differently: TAKE 1 TABLET BY MOUTH TWICE DAILY prn) 60 tablet 3    dorzolamide (TRUSOPT) 2 % ophthalmic solution Place 2 drops into both eyes 2 times daily           Review of Systems: 14 systems were negative except of what was stated on HPI    Nursing note and vitals reviewed. Vitals:    02/26/18 0902   BP: 132/80   Pulse: 68   Resp: 18   Weight: 196 lb (88.9 kg)   Height: 5' 8\" (1.727 m)     Wt Readings from Last 3 Encounters:   02/26/18 196 lb (88.9 kg)   01/17/18 191 lb (86.6 kg)   11/20/17 196 lb (88.9 kg)     BP Readings from Last 3 Encounters:   02/26/18 132/80   01/17/18 (!) 140/84   11/20/17 130/82     Body mass index is 29.8 kg/m². Constitutional: Patient appears well-developed and well-nourished. No distress. Head: Normocephalic and atraumatic. Neck: Normal range of motion. Neck supple. No thyroidmegaly. Cardiovascular: Normal rate, regular rhythm, normal heart sounds and intact distal pulses. Pulmonary/Chest: Effort normal and breath sounds normal. No stridor. No respiratory distress. No wheezes and no rales. Abdominal: Soft. Bowel sounds are normal. No distension and no mass. No tenderness. No rebound and no guarding. Musculoskeletal: No edema and no tenderness. Skin: No rash or erythema. Psychiatric: Normal mood and affect. Behavior is normal.     Assessment/Plan:  Edi Klein was seen today for 3 month follow-up. Diagnoses and all orders for this visit:    Attention deficit disorder (ADD) without hyperactivity  -     amphetamine-dextroamphetamine (ADDERALL) 30 MG tablet; Take 1 tablet by mouth 2 times daily for 30 days. -     amphetamine-dextroamphetamine (ADDERALL, 30MG,) 30 MG tablet; Take 1 tablet by mouth 2 times daily for 30 days. -     amphetamine-dextroamphetamine (ADDERALL, 30MG,) 30 MG tablet;  Take 1 tablet by mouth 2 times daily for 30

## 2018-05-31 ENCOUNTER — OFFICE VISIT (OUTPATIENT)
Dept: INTERNAL MEDICINE CLINIC | Age: 38
End: 2018-05-31

## 2018-05-31 VITALS
HEIGHT: 68 IN | DIASTOLIC BLOOD PRESSURE: 84 MMHG | BODY MASS INDEX: 28.49 KG/M2 | SYSTOLIC BLOOD PRESSURE: 138 MMHG | HEART RATE: 72 BPM | RESPIRATION RATE: 16 BRPM | WEIGHT: 188 LBS

## 2018-05-31 DIAGNOSIS — Z00.00 ANNUAL PHYSICAL EXAM: Primary | ICD-10-CM

## 2018-05-31 DIAGNOSIS — F98.8 ATTENTION DEFICIT DISORDER (ADD) WITHOUT HYPERACTIVITY: ICD-10-CM

## 2018-05-31 DIAGNOSIS — E29.1 HYPOGONADISM IN MALE: ICD-10-CM

## 2018-05-31 PROCEDURE — 99395 PREV VISIT EST AGE 18-39: CPT | Performed by: INTERNAL MEDICINE

## 2018-05-31 RX ORDER — DEXTROAMPHETAMINE SACCHARATE, AMPHETAMINE ASPARTATE, DEXTROAMPHETAMINE SULFATE AND AMPHETAMINE SULFATE 7.5; 7.5; 7.5; 7.5 MG/1; MG/1; MG/1; MG/1
30 TABLET ORAL 2 TIMES DAILY
Qty: 60 TABLET | Refills: 0 | Status: SHIPPED | OUTPATIENT
Start: 2018-07-01 | End: 2018-08-29 | Stop reason: SDUPTHER

## 2018-05-31 RX ORDER — DEXTROAMPHETAMINE SACCHARATE, AMPHETAMINE ASPARTATE, DEXTROAMPHETAMINE SULFATE AND AMPHETAMINE SULFATE 7.5; 7.5; 7.5; 7.5 MG/1; MG/1; MG/1; MG/1
30 TABLET ORAL 2 TIMES DAILY
Qty: 60 TABLET | Refills: 0 | Status: SHIPPED | OUTPATIENT
Start: 2018-05-31 | End: 2018-08-29 | Stop reason: SDUPTHER

## 2018-05-31 RX ORDER — DEXTROAMPHETAMINE SACCHARATE, AMPHETAMINE ASPARTATE, DEXTROAMPHETAMINE SULFATE AND AMPHETAMINE SULFATE 7.5; 7.5; 7.5; 7.5 MG/1; MG/1; MG/1; MG/1
30 TABLET ORAL 2 TIMES DAILY
Qty: 60 TABLET | Refills: 0 | Status: SHIPPED | OUTPATIENT
Start: 2018-08-01 | End: 2018-08-29 | Stop reason: SDUPTHER

## 2018-08-29 ENCOUNTER — OFFICE VISIT (OUTPATIENT)
Dept: INTERNAL MEDICINE CLINIC | Age: 38
End: 2018-08-29

## 2018-08-29 VITALS
OXYGEN SATURATION: 98 % | HEART RATE: 84 BPM | DIASTOLIC BLOOD PRESSURE: 80 MMHG | BODY MASS INDEX: 28.34 KG/M2 | SYSTOLIC BLOOD PRESSURE: 130 MMHG | WEIGHT: 186.4 LBS

## 2018-08-29 DIAGNOSIS — F98.8 ATTENTION DEFICIT DISORDER (ADD) WITHOUT HYPERACTIVITY: Primary | ICD-10-CM

## 2018-08-29 DIAGNOSIS — Z79.899 CONTROLLED SUBSTANCE AGREEMENT SIGNED: ICD-10-CM

## 2018-08-29 PROCEDURE — G8427 DOCREV CUR MEDS BY ELIG CLIN: HCPCS | Performed by: INTERNAL MEDICINE

## 2018-08-29 PROCEDURE — 1036F TOBACCO NON-USER: CPT | Performed by: INTERNAL MEDICINE

## 2018-08-29 PROCEDURE — G8417 CALC BMI ABV UP PARAM F/U: HCPCS | Performed by: INTERNAL MEDICINE

## 2018-08-29 PROCEDURE — 99213 OFFICE O/P EST LOW 20 MIN: CPT | Performed by: INTERNAL MEDICINE

## 2018-08-29 RX ORDER — DEXTROAMPHETAMINE SACCHARATE, AMPHETAMINE ASPARTATE, DEXTROAMPHETAMINE SULFATE AND AMPHETAMINE SULFATE 7.5; 7.5; 7.5; 7.5 MG/1; MG/1; MG/1; MG/1
30 TABLET ORAL 2 TIMES DAILY
Qty: 60 TABLET | Refills: 0 | Status: SHIPPED | OUTPATIENT
Start: 2018-09-01 | End: 2018-11-29 | Stop reason: SDUPTHER

## 2018-08-29 RX ORDER — DEXTROAMPHETAMINE SACCHARATE, AMPHETAMINE ASPARTATE, DEXTROAMPHETAMINE SULFATE AND AMPHETAMINE SULFATE 7.5; 7.5; 7.5; 7.5 MG/1; MG/1; MG/1; MG/1
30 TABLET ORAL 2 TIMES DAILY
Qty: 60 TABLET | Refills: 0 | Status: SHIPPED | OUTPATIENT
Start: 2018-11-01 | End: 2018-11-29 | Stop reason: SDUPTHER

## 2018-08-29 RX ORDER — DEXTROAMPHETAMINE SACCHARATE, AMPHETAMINE ASPARTATE, DEXTROAMPHETAMINE SULFATE AND AMPHETAMINE SULFATE 7.5; 7.5; 7.5; 7.5 MG/1; MG/1; MG/1; MG/1
30 TABLET ORAL 2 TIMES DAILY
Qty: 60 TABLET | Refills: 0 | Status: SHIPPED | OUTPATIENT
Start: 2018-10-01 | End: 2018-11-29 | Stop reason: SDUPTHER

## 2018-08-29 ASSESSMENT — PATIENT HEALTH QUESTIONNAIRE - PHQ9
2. FEELING DOWN, DEPRESSED OR HOPELESS: 0
SUM OF ALL RESPONSES TO PHQ9 QUESTIONS 1 & 2: 0
1. LITTLE INTEREST OR PLEASURE IN DOING THINGS: 0
SUM OF ALL RESPONSES TO PHQ QUESTIONS 1-9: 0
SUM OF ALL RESPONSES TO PHQ QUESTIONS 1-9: 0

## 2018-08-29 NOTE — PROGRESS NOTES
1 tablet by mouth 2 times daily for 30 days. .  -     amphetamine-dextroamphetamine (ADDERALL, 30MG,) 30 MG tablet; Take 1 tablet by mouth 2 times daily for 30 days. .  -     amphetamine-dextroamphetamine (ADDERALL, 30MG,) 30 MG tablet; Take 1 tablet by mouth 2 times daily for 30 days. .        Return 3 months ADD.

## 2018-11-15 ENCOUNTER — OFFICE VISIT (OUTPATIENT)
Dept: INTERNAL MEDICINE CLINIC | Age: 38
End: 2018-11-15
Payer: COMMERCIAL

## 2018-11-15 VITALS
HEART RATE: 79 BPM | BODY MASS INDEX: 28.59 KG/M2 | SYSTOLIC BLOOD PRESSURE: 102 MMHG | DIASTOLIC BLOOD PRESSURE: 64 MMHG | OXYGEN SATURATION: 98 % | TEMPERATURE: 98.4 F | WEIGHT: 188 LBS

## 2018-11-15 DIAGNOSIS — J06.9 URI, ACUTE: Primary | ICD-10-CM

## 2018-11-15 PROCEDURE — G8417 CALC BMI ABV UP PARAM F/U: HCPCS | Performed by: INTERNAL MEDICINE

## 2018-11-15 PROCEDURE — G8484 FLU IMMUNIZE NO ADMIN: HCPCS | Performed by: INTERNAL MEDICINE

## 2018-11-15 PROCEDURE — 99213 OFFICE O/P EST LOW 20 MIN: CPT | Performed by: INTERNAL MEDICINE

## 2018-11-15 PROCEDURE — 1036F TOBACCO NON-USER: CPT | Performed by: INTERNAL MEDICINE

## 2018-11-15 PROCEDURE — G8427 DOCREV CUR MEDS BY ELIG CLIN: HCPCS | Performed by: INTERNAL MEDICINE

## 2018-11-15 RX ORDER — AZITHROMYCIN 250 MG/1
TABLET, FILM COATED ORAL
Qty: 1 PACKET | Refills: 0 | Status: SHIPPED | OUTPATIENT
Start: 2018-11-15 | End: 2019-12-19

## 2018-11-15 RX ORDER — BIMATOPROST 0.01 %
DROPS OPHTHALMIC (EYE) 2 TIMES DAILY
Refills: 3 | COMMUNITY
Start: 2018-09-28

## 2018-11-29 ENCOUNTER — OFFICE VISIT (OUTPATIENT)
Dept: INTERNAL MEDICINE CLINIC | Age: 38
End: 2018-11-29
Payer: COMMERCIAL

## 2018-11-29 VITALS
WEIGHT: 185 LBS | OXYGEN SATURATION: 100 % | BODY MASS INDEX: 28.04 KG/M2 | HEIGHT: 68 IN | SYSTOLIC BLOOD PRESSURE: 122 MMHG | HEART RATE: 78 BPM | DIASTOLIC BLOOD PRESSURE: 68 MMHG

## 2018-11-29 DIAGNOSIS — Z79.899 CONTROLLED SUBSTANCE AGREEMENT SIGNED: ICD-10-CM

## 2018-11-29 DIAGNOSIS — F98.8 ATTENTION DEFICIT DISORDER (ADD) WITHOUT HYPERACTIVITY: ICD-10-CM

## 2018-11-29 PROCEDURE — G8484 FLU IMMUNIZE NO ADMIN: HCPCS | Performed by: INTERNAL MEDICINE

## 2018-11-29 PROCEDURE — G8417 CALC BMI ABV UP PARAM F/U: HCPCS | Performed by: INTERNAL MEDICINE

## 2018-11-29 PROCEDURE — 1036F TOBACCO NON-USER: CPT | Performed by: INTERNAL MEDICINE

## 2018-11-29 PROCEDURE — G8427 DOCREV CUR MEDS BY ELIG CLIN: HCPCS | Performed by: INTERNAL MEDICINE

## 2018-11-29 PROCEDURE — 99213 OFFICE O/P EST LOW 20 MIN: CPT | Performed by: INTERNAL MEDICINE

## 2018-11-29 RX ORDER — DEXTROAMPHETAMINE SACCHARATE, AMPHETAMINE ASPARTATE, DEXTROAMPHETAMINE SULFATE AND AMPHETAMINE SULFATE 7.5; 7.5; 7.5; 7.5 MG/1; MG/1; MG/1; MG/1
30 TABLET ORAL 2 TIMES DAILY
Qty: 60 TABLET | Refills: 0 | Status: SHIPPED | OUTPATIENT
Start: 2019-02-06 | End: 2019-03-06 | Stop reason: SDUPTHER

## 2018-11-29 RX ORDER — DEXTROAMPHETAMINE SACCHARATE, AMPHETAMINE ASPARTATE, DEXTROAMPHETAMINE SULFATE AND AMPHETAMINE SULFATE 7.5; 7.5; 7.5; 7.5 MG/1; MG/1; MG/1; MG/1
30 TABLET ORAL 2 TIMES DAILY
Qty: 60 TABLET | Refills: 0 | Status: SHIPPED | OUTPATIENT
Start: 2019-01-06 | End: 2019-03-06 | Stop reason: SDUPTHER

## 2018-11-29 RX ORDER — DEXTROAMPHETAMINE SACCHARATE, AMPHETAMINE ASPARTATE, DEXTROAMPHETAMINE SULFATE AND AMPHETAMINE SULFATE 7.5; 7.5; 7.5; 7.5 MG/1; MG/1; MG/1; MG/1
30 TABLET ORAL 2 TIMES DAILY
Qty: 60 TABLET | Refills: 0 | Status: SHIPPED | OUTPATIENT
Start: 2018-12-06 | End: 2019-03-06 | Stop reason: SDUPTHER

## 2019-03-06 ENCOUNTER — OFFICE VISIT (OUTPATIENT)
Dept: INTERNAL MEDICINE CLINIC | Age: 39
End: 2019-03-06
Payer: COMMERCIAL

## 2019-03-06 VITALS
OXYGEN SATURATION: 99 % | WEIGHT: 187 LBS | HEART RATE: 78 BPM | DIASTOLIC BLOOD PRESSURE: 82 MMHG | HEIGHT: 69 IN | SYSTOLIC BLOOD PRESSURE: 128 MMHG | BODY MASS INDEX: 27.7 KG/M2

## 2019-03-06 DIAGNOSIS — F98.8 ATTENTION DEFICIT DISORDER (ADD) WITHOUT HYPERACTIVITY: ICD-10-CM

## 2019-03-06 DIAGNOSIS — Z79.899 CONTROLLED SUBSTANCE AGREEMENT SIGNED: ICD-10-CM

## 2019-03-06 PROCEDURE — 99213 OFFICE O/P EST LOW 20 MIN: CPT | Performed by: INTERNAL MEDICINE

## 2019-03-06 RX ORDER — DEXTROAMPHETAMINE SACCHARATE, AMPHETAMINE ASPARTATE, DEXTROAMPHETAMINE SULFATE AND AMPHETAMINE SULFATE 7.5; 7.5; 7.5; 7.5 MG/1; MG/1; MG/1; MG/1
30 TABLET ORAL 2 TIMES DAILY
Qty: 60 TABLET | Refills: 0 | Status: SHIPPED | OUTPATIENT
Start: 2019-03-06 | End: 2019-06-13 | Stop reason: SDUPTHER

## 2019-03-06 RX ORDER — DEXTROAMPHETAMINE SACCHARATE, AMPHETAMINE ASPARTATE, DEXTROAMPHETAMINE SULFATE AND AMPHETAMINE SULFATE 7.5; 7.5; 7.5; 7.5 MG/1; MG/1; MG/1; MG/1
30 TABLET ORAL 2 TIMES DAILY
Qty: 60 TABLET | Refills: 0 | Status: SHIPPED | OUTPATIENT
Start: 2019-04-07 | End: 2019-06-13 | Stop reason: SDUPTHER

## 2019-03-06 RX ORDER — DEXTROAMPHETAMINE SACCHARATE, AMPHETAMINE ASPARTATE, DEXTROAMPHETAMINE SULFATE AND AMPHETAMINE SULFATE 7.5; 7.5; 7.5; 7.5 MG/1; MG/1; MG/1; MG/1
30 TABLET ORAL 2 TIMES DAILY
Qty: 60 TABLET | Refills: 0 | Status: SHIPPED | OUTPATIENT
Start: 2019-05-07 | End: 2019-06-13 | Stop reason: SDUPTHER

## 2019-03-06 ASSESSMENT — PATIENT HEALTH QUESTIONNAIRE - PHQ9
SUM OF ALL RESPONSES TO PHQ QUESTIONS 1-9: 0
SUM OF ALL RESPONSES TO PHQ9 QUESTIONS 1 & 2: 0
1. LITTLE INTEREST OR PLEASURE IN DOING THINGS: 0
2. FEELING DOWN, DEPRESSED OR HOPELESS: 0
SUM OF ALL RESPONSES TO PHQ QUESTIONS 1-9: 0

## 2019-06-13 ENCOUNTER — OFFICE VISIT (OUTPATIENT)
Dept: INTERNAL MEDICINE CLINIC | Age: 39
End: 2019-06-13
Payer: COMMERCIAL

## 2019-06-13 VITALS
BODY MASS INDEX: 26.66 KG/M2 | SYSTOLIC BLOOD PRESSURE: 116 MMHG | OXYGEN SATURATION: 99 % | HEIGHT: 69 IN | WEIGHT: 180 LBS | HEART RATE: 67 BPM | DIASTOLIC BLOOD PRESSURE: 62 MMHG

## 2019-06-13 DIAGNOSIS — Z79.899 CONTROLLED SUBSTANCE AGREEMENT SIGNED: ICD-10-CM

## 2019-06-13 DIAGNOSIS — Z00.00 ANNUAL PHYSICAL EXAM: Primary | ICD-10-CM

## 2019-06-13 DIAGNOSIS — F98.8 ATTENTION DEFICIT DISORDER (ADD) WITHOUT HYPERACTIVITY: ICD-10-CM

## 2019-06-13 PROCEDURE — 99395 PREV VISIT EST AGE 18-39: CPT | Performed by: INTERNAL MEDICINE

## 2019-06-13 RX ORDER — DEXTROAMPHETAMINE SACCHARATE, AMPHETAMINE ASPARTATE, DEXTROAMPHETAMINE SULFATE AND AMPHETAMINE SULFATE 7.5; 7.5; 7.5; 7.5 MG/1; MG/1; MG/1; MG/1
30 TABLET ORAL 2 TIMES DAILY
Qty: 60 TABLET | Refills: 0 | Status: SHIPPED | OUTPATIENT
Start: 2019-07-13 | End: 2019-09-16 | Stop reason: SDUPTHER

## 2019-06-13 RX ORDER — DEXTROAMPHETAMINE SACCHARATE, AMPHETAMINE ASPARTATE, DEXTROAMPHETAMINE SULFATE AND AMPHETAMINE SULFATE 7.5; 7.5; 7.5; 7.5 MG/1; MG/1; MG/1; MG/1
30 TABLET ORAL 2 TIMES DAILY
Qty: 60 TABLET | Refills: 0 | Status: SHIPPED | OUTPATIENT
Start: 2019-08-13 | End: 2019-09-16 | Stop reason: SDUPTHER

## 2019-06-13 RX ORDER — DEXTROAMPHETAMINE SACCHARATE, AMPHETAMINE ASPARTATE, DEXTROAMPHETAMINE SULFATE AND AMPHETAMINE SULFATE 7.5; 7.5; 7.5; 7.5 MG/1; MG/1; MG/1; MG/1
30 TABLET ORAL 2 TIMES DAILY
Qty: 60 TABLET | Refills: 0 | Status: SHIPPED | OUTPATIENT
Start: 2019-06-13 | End: 2019-09-16 | Stop reason: SDUPTHER

## 2019-06-13 NOTE — PROGRESS NOTES
Sig Dispense Refill    LUMIGAN 0.01 % SOLN ophthalmic drops USE 1 DROP INTO BOTH EYES AT BEDTIME  3    TESTOSTERONE IM Inject into the muscle 100 mg inj once a week         Past Medical History:   Diagnosis Date    Acromioclavicular joint arthritis 4/18/2016    ADHD (attention deficit hyperactivity disorder) 2005    Anxiety     Tear of left glenoid labrum 7/27/2015     Past Surgical History:   Procedure Laterality Date    SHOULDER ARTHROSCOPY Left 01/17/2017    WISDOM TOOTH EXTRACTION       Family History   Problem Relation Age of Onset    Diabetes Mother         diet control    Cancer Father         spleen, bone marrow    Bipolar Disorder Sister     Thyroid Disease Sister     Cancer Paternal Grandmother         colon    Cancer Paternal Grandfather         lung, throat and brain    Diabetes Maternal Aunt     Early Death Maternal Aunt 39    Cancer Maternal Uncle         ?     Diabetes Maternal Grandfather      Social History     Socioeconomic History    Marital status: Single     Spouse name: Not on file    Number of children: Not on file    Years of education: Not on file    Highest education level: Not on file   Occupational History    Not on file   Social Needs    Financial resource strain: Not on file    Food insecurity:     Worry: Not on file     Inability: Not on file    Transportation needs:     Medical: Not on file     Non-medical: Not on file   Tobacco Use    Smoking status: Never Smoker    Smokeless tobacco: Never Used   Substance and Sexual Activity    Alcohol use: Yes     Comment: rare    Drug use: No    Sexual activity: Yes     Partners: Female   Lifestyle    Physical activity:     Days per week: Not on file     Minutes per session: Not on file    Stress: Not on file   Relationships    Social connections:     Talks on phone: Not on file     Gets together: Not on file     Attends Restorationist service: Not on file     Active member of club or organization: Not on file Attends meetings of clubs or organizations: Not on file     Relationship status: Not on file    Intimate partner violence:     Fear of current or ex partner: Not on file     Emotionally abused: Not on file     Physically abused: Not on file     Forced sexual activity: Not on file   Other Topics Concern    Not on file   Social History Narrative    Not on file       Review of Systems:  A comprehensive review of systems was negative except for what was noted in the HPI. Physical Exam:   Vitals:    06/13/19 0741   BP: 116/62   Pulse: 67   SpO2: 99%   Weight: 180 lb (81.6 kg)   Height: 5' 9\" (1.753 m)     Body mass index is 26.58 kg/m². Constitutional: He is oriented to person, place, and time. He appears well-developed and well-nourished. No distress. HEENT:   Head: Normocephalic and atraumatic. Right Ear: Tympanic membrane, external ear and ear canal normal.   Left Ear: Tympanic membrane, external ear and ear canal normal.   Mouth/Throat: Oropharynx is clear and moist and mucous membranes are normal. No oropharyngeal exudate or posterior oropharyngeal erythema. He has no cervical adenopathy. Eyes: Conjunctivae and extraocular motions are normal. Pupils are equal, round, and reactive to light. Neck:  Supple. No JVD present. Carotid bruit is not present. No mass and no thyromegaly present. Cardiovascular: Normal rate, regular rhythm, normal heart sounds and intact distal pulses. Exam reveals no gallop and no friction rub. No murmur heard. Pulmonary/Chest: Effort normal and breath sounds normal. No respiratory distress. He has no wheezes, rhonchi or rales. Abdominal: Soft, non-tender. Bowel sounds and aorta are normal. There is no organomegaly, mass or bruit. Musculoskeletal: Normal range of motion, no synovitis. He exhibits no edema. Neurological: He is alert and oriented to person, place, and time. He has normal reflexes. No cranial nerve deficit.  Coordination normal.   Skin: Skin is warm, dry and intact. No suspicious lesions are noted. Psychiatric: He has a normal mood and affect. His speech is normal and behavior is normal. Judgment, cognition and memory are normal.     Preventive Care:  Health Maintenance   Topic Date Due    Varicella Vaccine (1 of 2 - 13+ 2-dose series) 02/04/1994    DTaP/Tdap/Td vaccine (2 - Td) 10/01/2018    Flu vaccine (Season Ended) 09/01/2019    Pneumococcal 0-64 years Vaccine  Aged Out    HIV screen  Discontinued        Sexual activity: has sex with females   Last eye exam: 2019, normal  Exercise: aerobics 6 time(s) per week         Preventive plan of care for Ezra Morales        6/13/2019           Preventive Measures Status       Recommendations for screening           Diabetes Screen  Glucose (mg/dL)   Date Value   07/21/2017 76    Test recommended and ordered   Cholesterol Screen  Lab Results   Component Value Date    CHOL 125 07/21/2017    TRIG 77 07/21/2017    HDL 37 (L) 07/21/2017    LDLCALC 73 07/21/2017    Test recommended and ordered       Weight: Body mass index is 26.58 kg/m².   5' 9\" (1.753 m)180 lb (81.6 kg)  Your BMI is 25 or greater, which indicates that you are overweight        Recommended Immunizations    Immunization History   Administered Date(s) Administered    Tdap (Boostrix, Adacel) 10/01/2008    Influenza vaccine:  recommended every fall  Tetanus vaccine:  tetanus and diptheria vaccine (Td) administered today- risks and benefits discussed         Other Recommendations ·   See a dentist every 6 months  · Try to get at least 30 minutes of exercise 3-5 days per week  · Always wear a seat belt when traveling in a car  · Always wear a helmet when riding a bicycle or motorcycle  · When exposed to the sun, use a sunscreen that protects against both UVA and UVB radiation with an SPF of 30 or greater- reapply every 2 to 3 hours or after sweating, drying off with a towel, or swimming             Assessment/Plan:  Maryana Deng was seen today for adhd and annual exam.    Diagnoses and all orders for this visit:    Annual physical exam  -     Lipid Panel; Future  -     Comprehensive Metabolic Panel; Future  -     CBC with Differential; Future    Attention deficit disorder (ADD) without hyperactivity  -     amphetamine-dextroamphetamine (ADDERALL) 30 MG tablet; Take 1 tablet by mouth 2 times daily for 30 days. -     amphetamine-dextroamphetamine (ADDERALL, 30MG,) 30 MG tablet; Take 1 tablet by mouth 2 times daily for 30 days. -     amphetamine-dextroamphetamine (ADDERALL, 30MG,) 30 MG tablet; Take 1 tablet by mouth 2 times daily for 30 days. Controlled substance agreement signed  -     amphetamine-dextroamphetamine (ADDERALL) 30 MG tablet; Take 1 tablet by mouth 2 times daily for 30 days. -     amphetamine-dextroamphetamine (ADDERALL, 30MG,) 30 MG tablet; Take 1 tablet by mouth 2 times daily for 30 days. -     amphetamine-dextroamphetamine (ADDERALL, 30MG,) 30 MG tablet; Take 1 tablet by mouth 2 times daily for 30 days. Controlled Substance Monitoring:    Acute and Chronic Pain Monitoring:   RX Monitoring 6/13/2019   Attestation -   Periodic Controlled Substance Monitoring Possible medication side effects, risk of tolerance/dependence & alternative treatments discussed. ;No signs of potential drug abuse or diversion identified. Return 3 months ADD.

## 2019-06-19 ENCOUNTER — OFFICE VISIT (OUTPATIENT)
Dept: INTERNAL MEDICINE CLINIC | Age: 39
End: 2019-06-19
Payer: COMMERCIAL

## 2019-06-19 VITALS
WEIGHT: 182 LBS | TEMPERATURE: 98.3 F | DIASTOLIC BLOOD PRESSURE: 80 MMHG | OXYGEN SATURATION: 99 % | HEART RATE: 84 BPM | BODY MASS INDEX: 26.96 KG/M2 | HEIGHT: 69 IN | SYSTOLIC BLOOD PRESSURE: 126 MMHG

## 2019-06-19 DIAGNOSIS — J03.90 TONSILLITIS: Primary | ICD-10-CM

## 2019-06-19 PROCEDURE — 99213 OFFICE O/P EST LOW 20 MIN: CPT | Performed by: INTERNAL MEDICINE

## 2019-06-19 RX ORDER — AMOXICILLIN AND CLAVULANATE POTASSIUM 875; 125 MG/1; MG/1
1 TABLET, FILM COATED ORAL 2 TIMES DAILY
Qty: 20 TABLET | Refills: 0 | Status: SHIPPED | OUTPATIENT
Start: 2019-06-19 | End: 2019-06-29

## 2019-06-19 NOTE — PROGRESS NOTES
Edward Tobin  YOB: 1980    Date of Service:  6/19/2019    Chief Complaint:      Chief Complaint   Patient presents with    Pharyngitis       HPI:  Marguerite Petit is a 46 y.o. He's complain of sore throat for 2 days and noticed some exudate in back of his throat. No fever/chills, cough or ear pain. No results found for: Rubbie Grills  Lab Results   Component Value Date     07/21/2017    K 4.7 07/21/2017     07/21/2017    CO2 26 07/21/2017    BUN 18 07/21/2017    CREATININE 1.2 07/21/2017    GLUCOSE 76 07/21/2017    CALCIUM 9.6 07/21/2017     Lab Results   Component Value Date    CHOL 125 07/21/2017    TRIG 77 07/21/2017    HDL 37 07/21/2017    LDLCALC 73 07/21/2017     Lab Results   Component Value Date    ALT 25 07/21/2017    AST 27 07/21/2017     Lab Results   Component Value Date    TSH 1.00 12/11/2013    T4FREE 1.1 12/11/2013     Lab Results   Component Value Date    WBC 4.6 07/21/2017    HGB 16.0 07/21/2017    HCT 46.8 07/21/2017    MCV 94.7 07/21/2017     07/21/2017     No results found for: INR   No results found for: PSA   No results found for: [address]     Patient Active Problem List   Diagnosis    Increased intraocular pressure    Attention deficit disorder (ADD) without hyperactivity    Recurrent genital HSV (herpes simplex virus) infection    Controlled substance agreement signed    Hypogonadism in male       No Known Allergies  Outpatient Medications Marked as Taking for the 6/19/19 encounter (Office Visit) with Elizabeth Engel MD   Medication Sig Dispense Refill    [START ON 8/13/2019] amphetamine-dextroamphetamine (ADDERALL) 30 MG tablet Take 1 tablet by mouth 2 times daily for 30 days. 60 tablet 0    [START ON 7/13/2019] amphetamine-dextroamphetamine (ADDERALL, 30MG,) 30 MG tablet Take 1 tablet by mouth 2 times daily for 30 days.  60 tablet 0    amphetamine-dextroamphetamine (ADDERALL, 30MG,) 30 MG tablet Take 1 tablet by mouth 2 times daily for 30 days. 60 tablet 0    LUMIGAN 0.01 % SOLN ophthalmic drops USE 1 DROP INTO BOTH EYES AT BEDTIME  3    azithromycin (ZITHROMAX Z-ROSE) 250 MG tablet 2 pills the first day, then 1 pill once a day for 4 more days 1 packet 0    TESTOSTERONE IM Inject into the muscle 100 mg inj once a week      acyclovir (ZOVIRAX) 400 MG tablet TAKE 1 TABLET BY MOUTH TWICE DAILY (Patient taking differently: TAKE 1 TABLET BY MOUTH TWICE DAILY prn) 60 tablet 3         Review of Systems: 14 systems were negative except of what was stated on HPI    Nursing note and vitals reviewed. Vitals:    06/19/19 1534   BP: 126/80   Pulse: 84   Temp: 98.3 °F (36.8 °C)   TempSrc: Oral   SpO2: 99%   Weight: 182 lb (82.6 kg)   Height: 5' 9\" (1.753 m)     Wt Readings from Last 3 Encounters:   06/19/19 182 lb (82.6 kg)   06/13/19 180 lb (81.6 kg)   03/06/19 187 lb (84.8 kg)     BP Readings from Last 3 Encounters:   06/19/19 126/80   06/13/19 116/62   03/06/19 128/82     Body mass index is 26.88 kg/m². Constitutional: Patient appears well-developed and well-nourished. No distress. Head: Normocephalic and atraumatic. Neck: Normal range of motion. Neck supple. No thyroidmegaly. Cardiovascular: Normal rate, regular rhythm, normal heart sounds and intact distal pulses. Pulmonary/Chest: Effort normal and breath sounds normal. No stridor. No respiratory distress. No wheezes and no rales. Abdominal: Soft. Bowel sounds are normal. No distension and no mass. No tenderness. No rebound and no guarding. Musculoskeletal: No edema and no tenderness. Skin: No rash or erythema. Psychiatric: Normal mood and affect. Behavior is normal.     Assessment/Plan:  Lyudmila Ramesh was seen today for pharyngitis. Diagnoses and all orders for this visit:    Tonsillitis  -     amoxicillin-clavulanate (AUGMENTIN) 875-125 MG per tablet; Take 1 tablet by mouth 2 times daily for 10 days        Return if symptoms worsen or fail to improve.

## 2019-09-16 ENCOUNTER — OFFICE VISIT (OUTPATIENT)
Dept: INTERNAL MEDICINE CLINIC | Age: 39
End: 2019-09-16
Payer: COMMERCIAL

## 2019-09-16 VITALS
WEIGHT: 189 LBS | SYSTOLIC BLOOD PRESSURE: 134 MMHG | DIASTOLIC BLOOD PRESSURE: 82 MMHG | OXYGEN SATURATION: 99 % | HEIGHT: 69 IN | HEART RATE: 69 BPM | BODY MASS INDEX: 27.99 KG/M2

## 2019-09-16 DIAGNOSIS — F98.8 ATTENTION DEFICIT DISORDER (ADD) WITHOUT HYPERACTIVITY: ICD-10-CM

## 2019-09-16 DIAGNOSIS — Z79.899 CONTROLLED SUBSTANCE AGREEMENT SIGNED: ICD-10-CM

## 2019-09-16 PROCEDURE — 99213 OFFICE O/P EST LOW 20 MIN: CPT | Performed by: INTERNAL MEDICINE

## 2019-09-16 RX ORDER — DEXTROAMPHETAMINE SACCHARATE, AMPHETAMINE ASPARTATE, DEXTROAMPHETAMINE SULFATE AND AMPHETAMINE SULFATE 7.5; 7.5; 7.5; 7.5 MG/1; MG/1; MG/1; MG/1
30 TABLET ORAL 2 TIMES DAILY
Qty: 60 TABLET | Refills: 0 | Status: SHIPPED | OUTPATIENT
Start: 2019-09-16 | End: 2019-12-19 | Stop reason: SDUPTHER

## 2019-09-16 RX ORDER — DEXTROAMPHETAMINE SACCHARATE, AMPHETAMINE ASPARTATE, DEXTROAMPHETAMINE SULFATE AND AMPHETAMINE SULFATE 7.5; 7.5; 7.5; 7.5 MG/1; MG/1; MG/1; MG/1
30 TABLET ORAL 2 TIMES DAILY
Qty: 60 TABLET | Refills: 0 | Status: SHIPPED | OUTPATIENT
Start: 2019-10-16 | End: 2019-12-19 | Stop reason: SDUPTHER

## 2019-09-16 RX ORDER — DEXTROAMPHETAMINE SACCHARATE, AMPHETAMINE ASPARTATE, DEXTROAMPHETAMINE SULFATE AND AMPHETAMINE SULFATE 7.5; 7.5; 7.5; 7.5 MG/1; MG/1; MG/1; MG/1
30 TABLET ORAL 2 TIMES DAILY
Qty: 60 TABLET | Refills: 0 | Status: SHIPPED | OUTPATIENT
Start: 2019-11-16 | End: 2019-12-19 | Stop reason: SDUPTHER

## 2019-09-16 NOTE — PROGRESS NOTES
Josh Tobin  YOB: 1980    Date of Service:  9/16/2019    Chief Complaint:      Chief Complaint   Patient presents with    ADHD       HPI:  Winifred Villagomez is a 46 y.o. ADD:  Stable on Adderall 30 mg bid days 5-6 days a week.  No change in appetite or sleep disturbance.   Hypogonadism:  Stable on testosterone 100 mg IM qweek at home per urologist.    No results found for: Raymond Canseco  Lab Results   Component Value Date     07/21/2017    K 4.7 07/21/2017     07/21/2017    CO2 26 07/21/2017    BUN 18 07/21/2017    CREATININE 1.2 07/21/2017    GLUCOSE 76 07/21/2017    CALCIUM 9.6 07/21/2017     Lab Results   Component Value Date    CHOL 125 07/21/2017    TRIG 77 07/21/2017    HDL 37 07/21/2017    LDLCALC 73 07/21/2017     Lab Results   Component Value Date    ALT 25 07/21/2017    AST 27 07/21/2017     Lab Results   Component Value Date    TSH 1.00 12/11/2013    T4FREE 1.1 12/11/2013     Lab Results   Component Value Date    WBC 4.6 07/21/2017    HGB 16.0 07/21/2017    HCT 46.8 07/21/2017    MCV 94.7 07/21/2017     07/21/2017     No results found for: INR   No results found for: PSA   No results found for: [address]     Patient Active Problem List   Diagnosis    Increased intraocular pressure    Attention deficit disorder (ADD) without hyperactivity    Recurrent genital HSV (herpes simplex virus) infection    Controlled substance agreement signed    Hypogonadism in male       No Known Allergies  Outpatient Medications Marked as Taking for the 9/16/19 encounter (Office Visit) with Nisha Razo MD   Medication Sig Dispense Refill    LUMIGAN 0.01 % SOLN ophthalmic drops USE 1 DROP INTO BOTH EYES AT BEDTIME  3    azithromycin (ZITHROMAX Z-ROSE) 250 MG tablet 2 pills the first day, then 1 pill once a day for 4 more days 1 packet 0    TESTOSTERONE IM Inject into the muscle 100 mg inj once a week      acyclovir (ZOVIRAX) 400 MG tablet TAKE 1 TABLET BY MOUTH TWICE DAILY

## 2019-12-19 ENCOUNTER — OFFICE VISIT (OUTPATIENT)
Dept: INTERNAL MEDICINE CLINIC | Age: 39
End: 2019-12-19
Payer: COMMERCIAL

## 2019-12-19 VITALS
HEART RATE: 81 BPM | RESPIRATION RATE: 16 BRPM | DIASTOLIC BLOOD PRESSURE: 86 MMHG | WEIGHT: 185 LBS | HEIGHT: 69 IN | TEMPERATURE: 97.9 F | BODY MASS INDEX: 27.4 KG/M2 | OXYGEN SATURATION: 98 % | SYSTOLIC BLOOD PRESSURE: 132 MMHG

## 2019-12-19 DIAGNOSIS — Z79.899 CONTROLLED SUBSTANCE AGREEMENT SIGNED: ICD-10-CM

## 2019-12-19 DIAGNOSIS — F98.8 ATTENTION DEFICIT DISORDER (ADD) WITHOUT HYPERACTIVITY: ICD-10-CM

## 2019-12-19 PROCEDURE — 99213 OFFICE O/P EST LOW 20 MIN: CPT | Performed by: INTERNAL MEDICINE

## 2019-12-19 RX ORDER — DEXTROAMPHETAMINE SACCHARATE, AMPHETAMINE ASPARTATE, DEXTROAMPHETAMINE SULFATE AND AMPHETAMINE SULFATE 7.5; 7.5; 7.5; 7.5 MG/1; MG/1; MG/1; MG/1
30 TABLET ORAL 2 TIMES DAILY
Qty: 60 TABLET | Refills: 0 | Status: SHIPPED | OUTPATIENT
Start: 2019-12-19 | End: 2020-03-12 | Stop reason: SDUPTHER

## 2019-12-19 RX ORDER — DEXTROAMPHETAMINE SACCHARATE, AMPHETAMINE ASPARTATE, DEXTROAMPHETAMINE SULFATE AND AMPHETAMINE SULFATE 7.5; 7.5; 7.5; 7.5 MG/1; MG/1; MG/1; MG/1
30 TABLET ORAL 2 TIMES DAILY
Qty: 60 TABLET | Refills: 0 | Status: SHIPPED | OUTPATIENT
Start: 2020-02-19 | End: 2020-03-12 | Stop reason: SDUPTHER

## 2019-12-19 RX ORDER — DEXTROAMPHETAMINE SACCHARATE, AMPHETAMINE ASPARTATE, DEXTROAMPHETAMINE SULFATE AND AMPHETAMINE SULFATE 7.5; 7.5; 7.5; 7.5 MG/1; MG/1; MG/1; MG/1
30 TABLET ORAL 2 TIMES DAILY
Qty: 60 TABLET | Refills: 0 | Status: SHIPPED | OUTPATIENT
Start: 2020-01-19 | End: 2020-03-12 | Stop reason: SDUPTHER

## 2020-03-12 ENCOUNTER — OFFICE VISIT (OUTPATIENT)
Dept: INTERNAL MEDICINE CLINIC | Age: 40
End: 2020-03-12
Payer: COMMERCIAL

## 2020-03-12 VITALS
SYSTOLIC BLOOD PRESSURE: 124 MMHG | OXYGEN SATURATION: 99 % | HEIGHT: 69 IN | DIASTOLIC BLOOD PRESSURE: 80 MMHG | HEART RATE: 54 BPM | WEIGHT: 182 LBS | BODY MASS INDEX: 26.96 KG/M2

## 2020-03-12 PROCEDURE — 99213 OFFICE O/P EST LOW 20 MIN: CPT | Performed by: INTERNAL MEDICINE

## 2020-03-12 RX ORDER — DEXTROAMPHETAMINE SACCHARATE, AMPHETAMINE ASPARTATE, DEXTROAMPHETAMINE SULFATE AND AMPHETAMINE SULFATE 7.5; 7.5; 7.5; 7.5 MG/1; MG/1; MG/1; MG/1
30 TABLET ORAL 2 TIMES DAILY
Qty: 60 TABLET | Refills: 0 | Status: SHIPPED | OUTPATIENT
Start: 2020-05-20 | End: 2020-07-06 | Stop reason: SDUPTHER

## 2020-03-12 RX ORDER — DEXTROAMPHETAMINE SACCHARATE, AMPHETAMINE ASPARTATE, DEXTROAMPHETAMINE SULFATE AND AMPHETAMINE SULFATE 7.5; 7.5; 7.5; 7.5 MG/1; MG/1; MG/1; MG/1
30 TABLET ORAL 2 TIMES DAILY
Qty: 60 TABLET | Refills: 0 | Status: SHIPPED | OUTPATIENT
Start: 2020-04-20 | End: 2020-07-06 | Stop reason: SDUPTHER

## 2020-03-12 RX ORDER — HYDROXYZINE PAMOATE 25 MG/1
25 CAPSULE ORAL 3 TIMES DAILY PRN
Qty: 30 CAPSULE | Refills: 0 | Status: SHIPPED | OUTPATIENT
Start: 2020-03-12 | End: 2020-04-11

## 2020-03-12 RX ORDER — DEXTROAMPHETAMINE SACCHARATE, AMPHETAMINE ASPARTATE, DEXTROAMPHETAMINE SULFATE AND AMPHETAMINE SULFATE 7.5; 7.5; 7.5; 7.5 MG/1; MG/1; MG/1; MG/1
30 TABLET ORAL 2 TIMES DAILY
Qty: 60 TABLET | Refills: 0 | Status: SHIPPED | OUTPATIENT
Start: 2020-03-20 | End: 2020-07-06 | Stop reason: SDUPTHER

## 2020-03-12 ASSESSMENT — PATIENT HEALTH QUESTIONNAIRE - PHQ9
SUM OF ALL RESPONSES TO PHQ QUESTIONS 1-9: 1
1. LITTLE INTEREST OR PLEASURE IN DOING THINGS: 0
2. FEELING DOWN, DEPRESSED OR HOPELESS: 1
SUM OF ALL RESPONSES TO PHQ9 QUESTIONS 1 & 2: 1
SUM OF ALL RESPONSES TO PHQ QUESTIONS 1-9: 1

## 2020-03-12 NOTE — PROGRESS NOTES
(Office Visit) with Jamshid Engel MD   Medication Sig Dispense Refill    amphetamine-dextroamphetamine (ADDERALL) 30 MG tablet Take 1 tablet by mouth 2 times daily for 30 days. 60 tablet 0    LUMIGAN 0.01 % SOLN ophthalmic drops USE 1 DROP INTO BOTH EYES AT BEDTIME  3    TESTOSTERONE IM Inject into the muscle 100 mg inj once a week           Review of Systems: 14 systems were negative except of what was stated on HPI    Nursing note and vitals reviewed. Vitals:    03/12/20 1305   BP: 124/80   Pulse: 54   SpO2: 99%   Weight: 182 lb (82.6 kg)   Height: 5' 9\" (1.753 m)     Wt Readings from Last 3 Encounters:   03/12/20 182 lb (82.6 kg)   12/19/19 185 lb (83.9 kg)   09/16/19 189 lb (85.7 kg)     BP Readings from Last 3 Encounters:   03/12/20 124/80   12/19/19 132/86   09/16/19 134/82     Body mass index is 26.88 kg/m². Constitutional: Patient appears well-developed and well-nourished. No distress. Head: Normocephalic and atraumatic. Neck: Normal range of motion. Neck supple. No thyroidmegaly. Cardiovascular: Normal rate, regular rhythm, normal heart sounds and intact distal pulses. Pulmonary/Chest: Effort normal and breath sounds normal. No stridor. No respiratory distress. No wheezes and no rales. Abdominal: Soft. Bowel sounds are normal. No distension and no mass. No tenderness. No rebound and no guarding. Musculoskeletal: No edema and no tenderness. Skin: No rash or erythema. Psychiatric: Normal mood and affect. Behavior is normal.     Assessment/Plan:  Ivan Tovar was seen today for adhd. Diagnoses and all orders for this visit:    Reaction, adjustment, with anxious mood  Start  hydrOXYzine (VISTARIL) 25 MG capsule;  Take 1 capsule by mouth 3 times daily as needed for Anxiety  Start  sertraline (ZOLOFT) 50 MG tablet; Start with 1/2 pill qhs for 6-8 days, can increase to 1 daily as tolerated    Attention deficit disorder (ADD) without hyperactivity  -     amphetamine-dextroamphetamine (ADDERALL) 30 MG tablet; Take 1 tablet by mouth 2 times daily for 30 days. -     amphetamine-dextroamphetamine (ADDERALL, 30MG,) 30 MG tablet; Take 1 tablet by mouth 2 times daily for 30 days. -     amphetamine-dextroamphetamine (ADDERALL, 30MG,) 30 MG tablet; Take 1 tablet by mouth 2 times daily for 30 days. Controlled substance agreement signed  -     amphetamine-dextroamphetamine (ADDERALL) 30 MG tablet; Take 1 tablet by mouth 2 times daily for 30 days. -     amphetamine-dextroamphetamine (ADDERALL, 30MG,) 30 MG tablet; Take 1 tablet by mouth 2 times daily for 30 days. -     amphetamine-dextroamphetamine (ADDERALL, 30MG,) 30 MG tablet; Take 1 tablet by mouth 2 times daily for 30 days. Return 1 month on anxiety, mood.

## 2020-03-23 RX ORDER — ACYCLOVIR 400 MG/1
TABLET ORAL
Qty: 30 TABLET | Refills: 0 | Status: SHIPPED | OUTPATIENT
Start: 2020-03-23 | End: 2020-07-27

## 2020-06-04 ENCOUNTER — TELEPHONE (OUTPATIENT)
Dept: INTERNAL MEDICINE CLINIC | Age: 40
End: 2020-06-04

## 2020-07-06 ENCOUNTER — TELEMEDICINE (OUTPATIENT)
Dept: INTERNAL MEDICINE CLINIC | Age: 40
End: 2020-07-06
Payer: COMMERCIAL

## 2020-07-06 PROCEDURE — 99213 OFFICE O/P EST LOW 20 MIN: CPT | Performed by: INTERNAL MEDICINE

## 2020-07-06 RX ORDER — DEXTROAMPHETAMINE SACCHARATE, AMPHETAMINE ASPARTATE, DEXTROAMPHETAMINE SULFATE AND AMPHETAMINE SULFATE 7.5; 7.5; 7.5; 7.5 MG/1; MG/1; MG/1; MG/1
30 TABLET ORAL 2 TIMES DAILY
Qty: 60 TABLET | Refills: 0 | Status: SHIPPED | OUTPATIENT
Start: 2020-09-06 | End: 2020-10-07 | Stop reason: SDUPTHER

## 2020-07-06 RX ORDER — DEXTROAMPHETAMINE SACCHARATE, AMPHETAMINE ASPARTATE, DEXTROAMPHETAMINE SULFATE AND AMPHETAMINE SULFATE 7.5; 7.5; 7.5; 7.5 MG/1; MG/1; MG/1; MG/1
30 TABLET ORAL 2 TIMES DAILY
Qty: 60 TABLET | Refills: 0 | Status: SHIPPED | OUTPATIENT
Start: 2020-08-06 | End: 2020-10-07 | Stop reason: SDUPTHER

## 2020-07-06 RX ORDER — DEXTROAMPHETAMINE SACCHARATE, AMPHETAMINE ASPARTATE, DEXTROAMPHETAMINE SULFATE AND AMPHETAMINE SULFATE 7.5; 7.5; 7.5; 7.5 MG/1; MG/1; MG/1; MG/1
30 TABLET ORAL 2 TIMES DAILY
Qty: 60 TABLET | Refills: 0 | Status: SHIPPED | OUTPATIENT
Start: 2020-07-06 | End: 2020-10-07 | Stop reason: SDUPTHER

## 2020-07-06 NOTE — PROGRESS NOTES
Date of Service:  7/6/2020    Chief Complaint:      Chief Complaint   Patient presents with    ADHD       HPI:  Edu Estrada is a 45 y.o. Pursuant to the emergency declaration under the [address] [place] [address] [facility] authority and the [employer] Act, this Virtual  Video Visit was conducted, with patient's consent, to reduce the patient's risk of exposure to COVID-19 and provide continuity of care. Service is  provided through a video synchronous discussion virtually to substitute for in-person clinic visit with the patient being at home and Dr. Sheeba Caceres being at home. ADD:  Stable on Adderall 30 mg bid days 5-6 days a week.  No change in appetite or sleep disturbance.   Hypogonadism:  Stable on testosterone 100 mg IM qweek at home per urologist.    No results found for: Edwina Guthrie  Lab Results   Component Value Date     07/21/2017    K 4.7 07/21/2017     07/21/2017    CO2 26 07/21/2017    BUN 18 07/21/2017    CREATININE 1.2 07/21/2017    GLUCOSE 76 07/21/2017    CALCIUM 9.6 07/21/2017     Lab Results   Component Value Date    CHOL 125 07/21/2017    TRIG 77 07/21/2017    HDL 37 07/21/2017    LDLCALC 73 07/21/2017     Lab Results   Component Value Date    ALT 25 07/21/2017    AST 27 07/21/2017     Lab Results   Component Value Date    TSH 1.00 12/11/2013    T4FREE 1.1 12/11/2013     Lab Results   Component Value Date    WBC 4.6 07/21/2017    HGB 16.0 07/21/2017    HCT 46.8 07/21/2017    MCV 94.7 07/21/2017     07/21/2017     No results found for: INR   No results found for: PSA   No results found for: [address]     Patient Active Problem List   Diagnosis    Increased intraocular pressure    Attention deficit disorder (ADD) without hyperactivity    Recurrent genital HSV (herpes simplex virus) infection    Controlled substance agreement signed    Hypogonadism in male       No Known Allergies  Outpatient Medications Marked as Taking for the 7/6/20 encounter (Telemedicine) with Herberth Engel MD   Medication Sig Dispense Refill    acyclovir (ZOVIRAX) 400 MG tablet TAKE 1 TABLET BY MOUTH THREE TIMES A DAY FOR 10 DAYS 30 tablet 0    LUMIGAN 0.01 % SOLN ophthalmic drops USE 1 DROP INTO BOTH EYES AT BEDTIME  3    TESTOSTERONE IM Inject into the muscle 100 mg inj once a week           Review of Systems: 14 systems were negative except of what was stated on HPI    Nursing note and vitals reviewed. There were no vitals filed for this visit. Wt Readings from Last 3 Encounters:   03/12/20 182 lb (82.6 kg)   12/19/19 185 lb (83.9 kg)   09/16/19 189 lb (85.7 kg)     BP Readings from Last 3 Encounters:   03/12/20 124/80   12/19/19 132/86   09/16/19 134/82     There is no height or weight on file to calculate BMI. Constitutional: Patient appears well-developed and well-nourished. No distress. Head: Normocephalic and atraumatic. Skin: No rash or erythema. Psychiatric: Normal mood and affect. Behavior is normal.       Assessment/Plan:  Vandana Bello was seen today for adhd. Diagnoses and all orders for this visit:    Attention deficit disorder (ADD) without hyperactivity  -     amphetamine-dextroamphetamine (ADDERALL) 30 MG tablet; Take 1 tablet by mouth 2 times daily for 30 days. -     amphetamine-dextroamphetamine (ADDERALL, 30MG,) 30 MG tablet; Take 1 tablet by mouth 2 times daily for 30 days. -     amphetamine-dextroamphetamine (ADDERALL, 30MG,) 30 MG tablet; Take 1 tablet by mouth 2 times daily for 30 days. Controlled substance agreement signed  -     amphetamine-dextroamphetamine (ADDERALL) 30 MG tablet; Take 1 tablet by mouth 2 times daily for 30 days. -     amphetamine-dextroamphetamine (ADDERALL, 30MG,) 30 MG tablet; Take 1 tablet by mouth 2 times daily for 30 days. -     amphetamine-dextroamphetamine (ADDERALL, 30MG,) 30 MG tablet; Take 1 tablet by mouth 2 times daily for 30 days.         Return VV 3 months ADD.

## 2020-07-27 RX ORDER — ACYCLOVIR 400 MG/1
TABLET ORAL
Qty: 30 TABLET | Refills: 0 | Status: SHIPPED | OUTPATIENT
Start: 2020-07-27 | End: 2020-11-24

## 2020-10-07 ENCOUNTER — OFFICE VISIT (OUTPATIENT)
Dept: INTERNAL MEDICINE CLINIC | Age: 40
End: 2020-10-07
Payer: COMMERCIAL

## 2020-10-07 VITALS
OXYGEN SATURATION: 99 % | TEMPERATURE: 98.1 F | WEIGHT: 189 LBS | HEIGHT: 69 IN | HEART RATE: 78 BPM | BODY MASS INDEX: 27.99 KG/M2 | SYSTOLIC BLOOD PRESSURE: 124 MMHG | DIASTOLIC BLOOD PRESSURE: 82 MMHG

## 2020-10-07 PROCEDURE — 99213 OFFICE O/P EST LOW 20 MIN: CPT | Performed by: INTERNAL MEDICINE

## 2020-10-07 RX ORDER — DEXTROAMPHETAMINE SACCHARATE, AMPHETAMINE ASPARTATE, DEXTROAMPHETAMINE SULFATE AND AMPHETAMINE SULFATE 7.5; 7.5; 7.5; 7.5 MG/1; MG/1; MG/1; MG/1
30 TABLET ORAL 2 TIMES DAILY
Qty: 60 TABLET | Refills: 0 | Status: SHIPPED | OUTPATIENT
Start: 2020-10-07 | End: 2021-01-06 | Stop reason: SDUPTHER

## 2020-10-07 RX ORDER — DEXTROAMPHETAMINE SACCHARATE, AMPHETAMINE ASPARTATE, DEXTROAMPHETAMINE SULFATE AND AMPHETAMINE SULFATE 7.5; 7.5; 7.5; 7.5 MG/1; MG/1; MG/1; MG/1
30 TABLET ORAL 2 TIMES DAILY
Qty: 60 TABLET | Refills: 0 | Status: SHIPPED | OUTPATIENT
Start: 2020-12-07 | End: 2021-01-06 | Stop reason: SDUPTHER

## 2020-10-07 RX ORDER — DEXTROAMPHETAMINE SACCHARATE, AMPHETAMINE ASPARTATE, DEXTROAMPHETAMINE SULFATE AND AMPHETAMINE SULFATE 7.5; 7.5; 7.5; 7.5 MG/1; MG/1; MG/1; MG/1
30 TABLET ORAL 2 TIMES DAILY
Qty: 60 TABLET | Refills: 0 | Status: SHIPPED | OUTPATIENT
Start: 2020-11-07 | End: 2021-01-06 | Stop reason: SDUPTHER

## 2020-10-07 NOTE — PROGRESS NOTES
Kishan Syed  YOB: 1980    Date of Service:  10/7/2020    Chief Complaint:      Chief Complaint   Patient presents with    ADHD       HPI:  Kishan Syed is a 45 y.o. 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  No change in appetite or sleep disturbance. Hypogonadism:  Stable on testosterone 100 mg IM qweek at home per urologist.    No results found for: Cipriano Nissen  Lab Results   Component Value Date     07/21/2017    K 4.7 07/21/2017     07/21/2017    CO2 26 07/21/2017    BUN 18 07/21/2017    CREATININE 1.2 07/21/2017    GLUCOSE 76 07/21/2017    CALCIUM 9.6 07/21/2017     Lab Results   Component Value Date    CHOL 125 07/21/2017    TRIG 77 07/21/2017    HDL 37 07/21/2017    LDLCALC 73 07/21/2017     Lab Results   Component Value Date    ALT 25 07/21/2017    AST 27 07/21/2017     Lab Results   Component Value Date    TSH 1.00 12/11/2013    T4FREE 1.1 12/11/2013     Lab Results   Component Value Date    WBC 4.6 07/21/2017    HGB 16.0 07/21/2017    HCT 46.8 07/21/2017    MCV 94.7 07/21/2017     07/21/2017     No results found for: INR   No results found for: PSA   No results found for: [address]     Patient Active Problem List   Diagnosis    Increased intraocular pressure    Attention deficit disorder (ADD) without hyperactivity    Recurrent genital HSV (herpes simplex virus) infection    Controlled substance agreement signed    Hypogonadism in male       No Known Allergies  Outpatient Medications Marked as Taking for the 10/7/20 encounter (Office Visit) with Luisa Alicea MD   Medication Sig Dispense Refill    LUMIGAN 0.01 % SOLN ophthalmic drops USE 1 DROP INTO BOTH EYES AT BEDTIME  3    TESTOSTERONE IM Inject into the muscle 100 mg inj once a week           Review of Systems: 14 systems were negative except of what was stated on HPI    Nursing note and vitals reviewed.     Vitals:    10/07/20 1126 Substance Monitoring:    Acute and Chronic Pain Monitoring:   RX Monitoring 10/7/2020   Attestation -   Periodic Controlled Substance Monitoring Possible medication side effects, risk of tolerance/dependence & alternative treatments discussed. ;No signs of potential drug abuse or diversion identified. Return Jan 6 11:30 ADD f/u.

## 2020-11-24 RX ORDER — ACYCLOVIR 400 MG/1
TABLET ORAL
Qty: 30 TABLET | Refills: 0 | Status: SHIPPED | OUTPATIENT
Start: 2020-11-24 | End: 2021-11-29 | Stop reason: SDUPTHER

## 2021-01-06 ENCOUNTER — VIRTUAL VISIT (OUTPATIENT)
Dept: INTERNAL MEDICINE CLINIC | Age: 41
End: 2021-01-06
Payer: COMMERCIAL

## 2021-01-06 DIAGNOSIS — F98.8 ATTENTION DEFICIT DISORDER (ADD) WITHOUT HYPERACTIVITY: ICD-10-CM

## 2021-01-06 DIAGNOSIS — Z79.899 CONTROLLED SUBSTANCE AGREEMENT SIGNED: ICD-10-CM

## 2021-01-06 PROCEDURE — 99212 OFFICE O/P EST SF 10 MIN: CPT | Performed by: INTERNAL MEDICINE

## 2021-01-06 RX ORDER — DEXTROAMPHETAMINE SACCHARATE, AMPHETAMINE ASPARTATE, DEXTROAMPHETAMINE SULFATE AND AMPHETAMINE SULFATE 7.5; 7.5; 7.5; 7.5 MG/1; MG/1; MG/1; MG/1
30 TABLET ORAL 2 TIMES DAILY
Qty: 60 TABLET | Refills: 0 | Status: SHIPPED | OUTPATIENT
Start: 2021-02-06 | End: 2021-04-01 | Stop reason: SDUPTHER

## 2021-01-06 RX ORDER — DEXTROAMPHETAMINE SACCHARATE, AMPHETAMINE ASPARTATE, DEXTROAMPHETAMINE SULFATE AND AMPHETAMINE SULFATE 7.5; 7.5; 7.5; 7.5 MG/1; MG/1; MG/1; MG/1
30 TABLET ORAL 2 TIMES DAILY
Qty: 60 TABLET | Refills: 0 | Status: SHIPPED | OUTPATIENT
Start: 2021-03-06 | End: 2021-04-01 | Stop reason: SDUPTHER

## 2021-01-06 RX ORDER — DEXTROAMPHETAMINE SACCHARATE, AMPHETAMINE ASPARTATE, DEXTROAMPHETAMINE SULFATE AND AMPHETAMINE SULFATE 7.5; 7.5; 7.5; 7.5 MG/1; MG/1; MG/1; MG/1
30 TABLET ORAL 2 TIMES DAILY
Qty: 60 TABLET | Refills: 0 | Status: SHIPPED | OUTPATIENT
Start: 2021-01-06 | End: 2021-04-01 | Stop reason: SDUPTHER

## 2021-01-06 ASSESSMENT — PATIENT HEALTH QUESTIONNAIRE - PHQ9
SUM OF ALL RESPONSES TO PHQ QUESTIONS 1-9: 0
SUM OF ALL RESPONSES TO PHQ QUESTIONS 1-9: 0
2. FEELING DOWN, DEPRESSED OR HOPELESS: 0

## 2021-01-06 NOTE — PROGRESS NOTES
Date of Service:  1/6/2021    Chief Complaint:      Chief Complaint   Patient presents with    ADHD       HPI:  Suki Gonzalez is a 45 y.o. Pursuant to the emergency declaration under the [address] [place] [address] [facility] and the [employer] Act, this Virtual  Video Visit was conducted, with patient's consent, to reduce the patient's risk of exposure to COVID-19 and provide continuity of care. Service is  provided through a video synchronous discussion virtually to substitute for in-person clinic visit with the patient being at home and Dr. Rosana Kahn being at home. 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  No change in appetite or sleep disturbance.   Hypogonadism:  Stable on testosterone 100 mg IM qweek at home per urologist.    No results found for: Soheila Saravia  Lab Results   Component Value Date     07/21/2017    K 4.7 07/21/2017     07/21/2017    CO2 26 07/21/2017    BUN 18 07/21/2017    CREATININE 1.2 07/21/2017    GLUCOSE 76 07/21/2017    CALCIUM 9.6 07/21/2017     Lab Results   Component Value Date    CHOL 125 07/21/2017    TRIG 77 07/21/2017    HDL 37 07/21/2017    LDLCALC 73 07/21/2017     Lab Results   Component Value Date    ALT 25 07/21/2017    AST 27 07/21/2017     Lab Results   Component Value Date    TSH 1.00 12/11/2013    T4FREE 1.1 12/11/2013     Lab Results   Component Value Date    WBC 4.6 07/21/2017    HGB 16.0 07/21/2017    HCT 46.8 07/21/2017    MCV 94.7 07/21/2017     07/21/2017     No results found for: INR   No results found for: PSA   No results found for: [address]     Patient Active Problem List   Diagnosis    Increased intraocular pressure    Attention deficit disorder (ADD) without hyperactivity    Recurrent genital HSV (herpes simplex virus) infection    Controlled substance agreement signed  Hypogonadism in male       No Known Allergies  Outpatient Medications Marked as Taking for the 1/6/21 encounter (Virtual Visit) with Danielle Engel MD   Medication Sig Dispense Refill    acyclovir (ZOVIRAX) 400 MG tablet TAKE 1 TABLET BY MOUTH 3 TIMES A DAY FOR 10 DAYS 30 tablet 0    amphetamine-dextroamphetamine (ADDERALL) 30 MG tablet Take 1 tablet by mouth 2 times daily for 30 days. 60 tablet 0    LUMIGAN 0.01 % SOLN ophthalmic drops USE 1 DROP INTO BOTH EYES AT BEDTIME  3    TESTOSTERONE IM Inject into the muscle 100 mg inj once a week           Review of Systems: 14 systems were negative except of what was stated on HPI    Nursing note and vitals reviewed. There were no vitals filed for this visit. Wt Readings from Last 3 Encounters:   10/07/20 189 lb (85.7 kg)   03/12/20 182 lb (82.6 kg)   12/19/19 185 lb (83.9 kg)     BP Readings from Last 3 Encounters:   10/07/20 124/82   03/12/20 124/80   12/19/19 132/86     There is no height or weight on file to calculate BMI. Constitutional: Patient appears well-developed and well-nourished. No distress. Head: Normocephalic and atraumatic. Skin: No rash or erythema. Psychiatric: Normal mood and affect. Behavior is normal.       Assessment/Plan:  Ana Rosa Kilgore was seen today for adhd. Diagnoses and all orders for this visit:    Attention deficit disorder (ADD) without hyperactivity  -     amphetamine-dextroamphetamine (ADDERALL) 30 MG tablet; Take 1 tablet by mouth 2 times daily for 30 days. -     amphetamine-dextroamphetamine (ADDERALL, 30MG,) 30 MG tablet; Take 1 tablet by mouth 2 times daily for 30 days. -     amphetamine-dextroamphetamine (ADDERALL, 30MG,) 30 MG tablet; Take 1 tablet by mouth 2 times daily for 30 days. Controlled substance agreement signed  -     amphetamine-dextroamphetamine (ADDERALL) 30 MG tablet; Take 1 tablet by mouth 2 times daily for 30 days. -     amphetamine-dextroamphetamine (ADDERALL, 30MG,) 30 MG tablet; Take 1 tablet by mouth 2 times daily for 30 days. -     amphetamine-dextroamphetamine (ADDERALL, 30MG,) 30 MG tablet; Take 1 tablet by mouth 2 times daily for 30 days. Controlled Substance Monitoring:    Acute and Chronic Pain Monitoring:   RX Monitoring 1/6/2021   Attestation -   Periodic Controlled Substance Monitoring Possible medication side effects, risk of tolerance/dependence & alternative treatments discussed. ;No signs of potential drug abuse or diversion identified. Return April 1 at 1:20 ADD f/u.

## 2021-04-01 ENCOUNTER — VIRTUAL VISIT (OUTPATIENT)
Dept: INTERNAL MEDICINE CLINIC | Age: 41
End: 2021-04-01

## 2021-04-01 DIAGNOSIS — Z79.899 CONTROLLED SUBSTANCE AGREEMENT SIGNED: ICD-10-CM

## 2021-04-01 DIAGNOSIS — F98.8 ATTENTION DEFICIT DISORDER (ADD) WITHOUT HYPERACTIVITY: ICD-10-CM

## 2021-04-01 PROCEDURE — 99213 OFFICE O/P EST LOW 20 MIN: CPT | Performed by: INTERNAL MEDICINE

## 2021-04-01 RX ORDER — DEXTROAMPHETAMINE SACCHARATE, AMPHETAMINE ASPARTATE, DEXTROAMPHETAMINE SULFATE AND AMPHETAMINE SULFATE 7.5; 7.5; 7.5; 7.5 MG/1; MG/1; MG/1; MG/1
30 TABLET ORAL 2 TIMES DAILY
Qty: 60 TABLET | Refills: 0 | Status: SHIPPED | OUTPATIENT
Start: 2021-05-07 | End: 2021-07-07 | Stop reason: SDUPTHER

## 2021-04-01 RX ORDER — DEXTROAMPHETAMINE SACCHARATE, AMPHETAMINE ASPARTATE, DEXTROAMPHETAMINE SULFATE AND AMPHETAMINE SULFATE 7.5; 7.5; 7.5; 7.5 MG/1; MG/1; MG/1; MG/1
30 TABLET ORAL 2 TIMES DAILY
Qty: 60 TABLET | Refills: 0 | Status: SHIPPED | OUTPATIENT
Start: 2021-06-07 | End: 2021-07-07 | Stop reason: SDUPTHER

## 2021-04-01 RX ORDER — DEXTROAMPHETAMINE SACCHARATE, AMPHETAMINE ASPARTATE, DEXTROAMPHETAMINE SULFATE AND AMPHETAMINE SULFATE 7.5; 7.5; 7.5; 7.5 MG/1; MG/1; MG/1; MG/1
30 TABLET ORAL 2 TIMES DAILY
Qty: 60 TABLET | Refills: 0 | Status: SHIPPED | OUTPATIENT
Start: 2021-04-08 | End: 2021-07-07 | Stop reason: SDUPTHER

## 2021-04-01 NOTE — PROGRESS NOTES
virus) infection    Controlled substance agreement signed    Hypogonadism in male       No Known Allergies  Outpatient Medications Marked as Taking for the 4/1/21 encounter (Virtual Visit) with Anna Engel MD   Medication Sig Dispense Refill    amphetamine-dextroamphetamine (ADDERALL) 30 MG tablet Take 1 tablet by mouth 2 times daily for 30 days. 60 tablet 0    acyclovir (ZOVIRAX) 400 MG tablet TAKE 1 TABLET BY MOUTH 3 TIMES A DAY FOR 10 DAYS 30 tablet 0    LUMIGAN 0.01 % SOLN ophthalmic drops USE 1 DROP INTO BOTH EYES AT BEDTIME  3    TESTOSTERONE IM Inject into the muscle 100 mg inj once a week           Review of Systems: 14 systems were negative except of what was stated on HPI    Nursing note and vitals reviewed. There were no vitals filed for this visit. Wt Readings from Last 3 Encounters:   10/07/20 189 lb (85.7 kg)   03/12/20 182 lb (82.6 kg)   12/19/19 185 lb (83.9 kg)     BP Readings from Last 3 Encounters:   10/07/20 124/82   03/12/20 124/80   12/19/19 132/86     There is no height or weight on file to calculate BMI. Constitutional: Patient appears well-developed and well-nourished. No distress. Head: Normocephalic and atraumatic. Skin: No rash or erythema. Psychiatric: Normal mood and affect. Behavior is normal.       Assessment/Plan:  Bunny Infante was seen today for adhd. Diagnoses and all orders for this visit:    Attention deficit disorder (ADD) without hyperactivity  -     amphetamine-dextroamphetamine (ADDERALL) 30 MG tablet; Take 1 tablet by mouth 2 times daily for 30 days. -     amphetamine-dextroamphetamine (ADDERALL, 30MG,) 30 MG tablet; Take 1 tablet by mouth 2 times daily for 30 days. -     amphetamine-dextroamphetamine (ADDERALL, 30MG,) 30 MG tablet; Take 1 tablet by mouth 2 times daily for 30 days. Controlled substance agreement signed  -     amphetamine-dextroamphetamine (ADDERALL) 30 MG tablet; Take 1 tablet by mouth 2 times daily for 30 days.   -

## 2021-04-12 ENCOUNTER — TELEPHONE (OUTPATIENT)
Dept: INTERNAL MEDICINE CLINIC | Age: 41
End: 2021-04-12

## 2021-04-12 NOTE — TELEPHONE ENCOUNTER
RECEIVED MESSAGE VIA IN BASKET:    From: Kory Joyner MA  Sent: 4/7/2021   1:47 PM EDT  To: Radha Hatch  Subject: Insurance                                        This patient is in my workques for:    ([facility]) [employer] [facility]  Generic Commercial Coverage is present. Please review to see if more specific coverage would be appropriate      Is there a more specific [employer] for this insurance? Tried to call patient verify, no answer/no voicemail. Sent [email or web address] message requesting reply with photo copy of front/back insurance card.

## 2021-05-12 ENCOUNTER — VIRTUAL VISIT (OUTPATIENT)
Dept: INTERNAL MEDICINE CLINIC | Age: 41
End: 2021-05-12
Payer: COMMERCIAL

## 2021-05-12 ENCOUNTER — HOSPITAL ENCOUNTER (OUTPATIENT)
Age: 41
Setting detail: SPECIMEN
Discharge: HOME | End: 2021-05-12
Payer: COMMERCIAL

## 2021-05-12 DIAGNOSIS — R19.7 DIARRHEA, UNSPECIFIED TYPE: ICD-10-CM

## 2021-05-12 DIAGNOSIS — R19.7 DIARRHEA, UNSPECIFIED TYPE: Primary | ICD-10-CM

## 2021-05-12 DIAGNOSIS — K58.0 IRRITABLE BOWEL SYNDROME WITH DIARRHEA: ICD-10-CM

## 2021-05-12 PROCEDURE — 87505 NFCT AGENT DETECTION GI: CPT

## 2021-05-12 PROCEDURE — 99213 OFFICE O/P EST LOW 20 MIN: CPT | Performed by: INTERNAL MEDICINE

## 2021-05-12 RX ORDER — DICYCLOMINE HYDROCHLORIDE 10 MG/1
CAPSULE ORAL
Qty: 90 CAPSULE | Refills: 0 | Status: SHIPPED | OUTPATIENT
Start: 2021-05-12 | End: 2021-07-07

## 2021-05-12 NOTE — PROGRESS NOTES
Date of Service:  5/12/2021    Chief Complaint:      Chief Complaint   Patient presents with    Diarrhea     x 4 days       HPI:  Sima Zhong is a 37 y.o. Pursuant to the emergency declaration under the [facility] [place] [address] [facility] authority and the [employer] Act, this Virtual  Video Visit was conducted, with patient's consent, to reduce the patient's risk of exposure to COVID-19 and provide continuity of care. Service is  provided through a video synchronous discussion virtually to substitute for in-person clinic visit with the patient being at home and Dr. Darius Hernandez being at home. Patient consent to the video visit. He complains of having diarrhea every 10 mins for the past 4 days. He took some immodium yesterday with only slight relief. He has not been around anyone ill. He denies nausea/vomitting. He denies any increase in stress. No fever or chills. He does have h/o IBS with diarrhea after eating daily but not like this. He did drink some alcohol when he was out with friends after a volleyball game which could have flared up his IBS.     No results found for: LABA1C, LABMICR  Lab Results   Component Value Date     07/21/2017    K 4.7 07/21/2017     07/21/2017    CO2 26 07/21/2017    BUN 18 07/21/2017    CREATININE 1.2 07/21/2017    GLUCOSE 76 07/21/2017    CALCIUM 9.6 07/21/2017     Lab Results   Component Value Date    CHOL 125 07/21/2017    TRIG 77 07/21/2017    HDL 37 07/21/2017    LDLCALC 73 07/21/2017     Lab Results   Component Value Date    ALT 25 07/21/2017    AST 27 07/21/2017     Lab Results   Component Value Date    TSH 1.00 12/11/2013    T4FREE 1.1 12/11/2013     Lab Results   Component Value Date    WBC 4.6 07/21/2017    HGB 16.0 07/21/2017    HCT 46.8 07/21/2017    MCV 94.7 07/21/2017     07/21/2017     No results found for: INR   No results found for: PSA   No results found

## 2021-05-14 DIAGNOSIS — K58.0 IRRITABLE BOWEL SYNDROME WITH DIARRHEA: Primary | ICD-10-CM

## 2021-05-14 LAB — GI BACTERIAL PATHOGENS BY PCR: NORMAL

## 2021-05-14 RX ORDER — LOPERAMIDE HYDROCHLORIDE 2 MG/1
2 CAPSULE ORAL 4 TIMES DAILY PRN
Qty: 40 CAPSULE | Refills: 0 | Status: SHIPPED | OUTPATIENT
Start: 2021-05-14 | End: 2021-05-24

## 2021-05-14 RX ORDER — AMITRIPTYLINE HYDROCHLORIDE 10 MG/1
10 TABLET, FILM COATED ORAL NIGHTLY
Qty: 30 TABLET | Refills: 0 | Status: SHIPPED | OUTPATIENT
Start: 2021-05-14 | End: 2021-07-07

## 2021-05-17 DIAGNOSIS — R19.7 DIARRHEA, UNSPECIFIED TYPE: Primary | ICD-10-CM

## 2021-06-30 LAB
ALBUMIN SERPL-MCNC: NORMAL G/DL (ref 3.5–5.2)
ALP BLD-CCNC: NORMAL U/L (ref 53–128)
ALT SERPL-CCNC: NORMAL U/L (ref 0–45)
AST SERPL-CCNC: NORMAL U/L (ref 5–34)
BILIRUB SERPL-MCNC: NORMAL MG/DL (ref 0.1–1.2)
BILIRUBIN DIRECT: NORMAL MG/DL (ref 0–0.2)
BILIRUBIN, INDIRECT: NORMAL MG/DL (ref 0–1)
ESTRADIOL, ULTRASENSITIVE: NORMAL PG/ML
HCT VFR BLD CALC: NORMAL % (ref 40–51)
HEMOGLOBIN: NORMAL G/DL (ref 13.7–17.5)
MCH RBC QN AUTO: NORMAL PG (ref 26–34)
MCHC RBC AUTO-ENTMCNC: NORMAL G/DL (ref 30.7–35.5)
MCV RBC AUTO: NORMAL FL (ref 80–100)
PDW BLD-RTO: NORMAL % (ref 11.5–14.5)
PLATELETS: NORMAL 10*3/UL (ref 155–369)
PMV BLD AUTO: NORMAL FL (ref 8.8–12.5)
RBC: NORMAL 10*6/UL (ref 4.6–6.1)
TESTOSTERONE: NORMAL NG/DL (ref 175–781)
TOTAL PROTEIN: NORMAL G/DL (ref 6.4–8.3)
WBC: NORMAL 10*3/UL (ref 3.7–10.3)

## 2021-07-07 ENCOUNTER — VIRTUAL VISIT (OUTPATIENT)
Dept: INTERNAL MEDICINE CLINIC | Age: 41
End: 2021-07-07
Payer: COMMERCIAL

## 2021-07-07 DIAGNOSIS — F98.8 ATTENTION DEFICIT DISORDER (ADD) WITHOUT HYPERACTIVITY: Primary | ICD-10-CM

## 2021-07-07 DIAGNOSIS — Z79.899 CONTROLLED SUBSTANCE AGREEMENT SIGNED: ICD-10-CM

## 2021-07-07 PROCEDURE — 99213 OFFICE O/P EST LOW 20 MIN: CPT | Performed by: INTERNAL MEDICINE

## 2021-07-07 RX ORDER — DEXTROAMPHETAMINE SACCHARATE, AMPHETAMINE ASPARTATE, DEXTROAMPHETAMINE SULFATE AND AMPHETAMINE SULFATE 7.5; 7.5; 7.5; 7.5 MG/1; MG/1; MG/1; MG/1
30 TABLET ORAL 2 TIMES DAILY
Qty: 60 TABLET | Refills: 0 | Status: SHIPPED | OUTPATIENT
Start: 2021-08-11 | End: 2021-10-06 | Stop reason: SDUPTHER

## 2021-07-07 RX ORDER — DEXTROAMPHETAMINE SACCHARATE, AMPHETAMINE ASPARTATE, DEXTROAMPHETAMINE SULFATE AND AMPHETAMINE SULFATE 7.5; 7.5; 7.5; 7.5 MG/1; MG/1; MG/1; MG/1
30 TABLET ORAL 2 TIMES DAILY
Qty: 60 TABLET | Refills: 0 | Status: SHIPPED | OUTPATIENT
Start: 2021-09-10 | End: 2021-10-06 | Stop reason: SDUPTHER

## 2021-07-07 RX ORDER — DEXTROAMPHETAMINE SACCHARATE, AMPHETAMINE ASPARTATE, DEXTROAMPHETAMINE SULFATE AND AMPHETAMINE SULFATE 7.5; 7.5; 7.5; 7.5 MG/1; MG/1; MG/1; MG/1
30 TABLET ORAL 2 TIMES DAILY
Qty: 60 TABLET | Refills: 0 | Status: SHIPPED | OUTPATIENT
Start: 2021-07-11 | End: 2021-10-06 | Stop reason: SDUPTHER

## 2021-07-07 SDOH — ECONOMIC STABILITY: FOOD INSECURITY: WITHIN THE PAST 12 MONTHS, YOU WORRIED THAT YOUR FOOD WOULD RUN OUT BEFORE YOU GOT MONEY TO BUY MORE.: NEVER TRUE

## 2021-07-07 SDOH — ECONOMIC STABILITY: FOOD INSECURITY: WITHIN THE PAST 12 MONTHS, THE FOOD YOU BOUGHT JUST DIDN'T LAST AND YOU DIDN'T HAVE MONEY TO GET MORE.: NEVER TRUE

## 2021-07-07 ASSESSMENT — SOCIAL DETERMINANTS OF HEALTH (SDOH): HOW HARD IS IT FOR YOU TO PAY FOR THE VERY BASICS LIKE FOOD, HOUSING, MEDICAL CARE, AND HEATING?: NOT HARD AT ALL

## 2021-07-07 NOTE — PROGRESS NOTES
Pursuant to the emergency declaration under the [address] [facility], [facility] waiver authority and the [employer] Act, this Virtual  Video Visit was conducted, with patient's consent, to reduce the patient's risk of exposure to COVID-19 and provide continuity of care. Service is  provided through a video synchronous discussion virtually to substitute for in-person clinic visit with the patient being at home and Dr. Christina Lafleur being at home. Patient consent to the video visit. Date of Service:  7/7/2021    Chief Complaint:      Chief Complaint   Patient presents with    ADHD       Assessment/Plan:    [facility] was seen today for adhd. Diagnoses and all orders for this visit:    Attention deficit disorder (ADD) without hyperactivity  -     amphetamine-dextroamphetamine (ADDERALL, 30MG,) 30 MG tablet; Take 1 tablet by mouth 2 times daily for 30 days. -     amphetamine-dextroamphetamine (ADDERALL) 30 MG tablet; Take 1 tablet by mouth 2 times daily for 30 days. -     amphetamine-dextroamphetamine (ADDERALL, 30MG,) 30 MG tablet; Take 1 tablet by mouth 2 times daily for 30 days. Controlled substance agreement signed  -     amphetamine-dextroamphetamine (ADDERALL, 30MG,) 30 MG tablet; Take 1 tablet by mouth 2 times daily for 30 days. -     amphetamine-dextroamphetamine (ADDERALL) 30 MG tablet; Take 1 tablet by mouth 2 times daily for 30 days. -     amphetamine-dextroamphetamine (ADDERALL, 30MG,) 30 MG tablet; Take 1 tablet by mouth 2 times daily for 30 days. Controlled Substance Monitoring:    Acute and Chronic Pain Monitoring:   RX Monitoring 7/7/2021   Attestation -   Periodic Controlled Substance Monitoring Possible medication side effects, risk of tolerance/dependence & alternative treatments discussed. ;No signs of potential drug abuse or diversion identified. Return Oct 6 at 1 ADD f/u.       HPI:  Paulo Caanda is a 37 y. o. 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  No change in appetite or sleep disturbance. Hypogonadism:  Stable on testosterone 100 mg IM qweek at home per urologist.    No results found for: Sonny Sullivan  Lab Results   Component Value Date     07/21/2017    K 4.7 07/21/2017     07/21/2017    CO2 26 07/21/2017    BUN 18 07/21/2017    CREATININE 1.2 07/21/2017    GLUCOSE 76 07/21/2017    CALCIUM 9.6 07/21/2017     Lab Results   Component Value Date    CHOL 125 07/21/2017    TRIG 77 07/21/2017    HDL 37 07/21/2017    LDLCALC 73 07/21/2017     Lab Results   Component Value Date    ALT 25 07/21/2017    AST  06/29/2021      Comment:      Scheduled: 6/29/2021     Lab Results   Component Value Date    TSH 1.00 12/11/2013    T4FREE 1.1 12/11/2013     Lab Results   Component Value Date    WBC  06/29/2021      Comment:      Scheduled: 6/29/2021    HGB 16.0 07/21/2017    HCT 46.8 07/21/2017    MCV 94.7 07/21/2017     07/21/2017     No results found for: INR   No results found for: PSA   No results found for: [address]     Patient Active Problem List   Diagnosis    Increased intraocular pressure    Attention deficit disorder (ADD) without hyperactivity    Recurrent genital HSV (herpes simplex virus) infection    Controlled substance agreement signed    Hypogonadism in male    Irritable bowel syndrome with diarrhea       No Known Allergies  Outpatient Medications Marked as Taking for the 7/7/21 encounter (Virtual Visit) with Veronica Engel MD   Medication Sig Dispense Refill    amitriptyline (ELAVIL) 10 MG tablet Take 1 tablet by mouth nightly 30 tablet 0    dicyclomine (BENTYL) 10 MG capsule 1-2 po qid prn 90 capsule 0    amphetamine-dextroamphetamine (ADDERALL) 30 MG tablet Take 1 tablet by mouth 2 times daily for 30 days.  60 tablet 0    acyclovir (ZOVIRAX) 400 MG tablet TAKE 1 TABLET BY MOUTH 3 TIMES A DAY FOR 10 DAYS 30 tablet 0    LUMIGAN 0.01 % SOLN ophthalmic drops 2 times daily   3    TESTOSTERONE IM Inject into the muscle 100 mg inj once a week           Review of Systems: 14 systems were negative except of what was stated on HPI    Nursing note and vitals reviewed. There were no vitals filed for this visit. Wt Readings from Last 3 Encounters:   10/07/20 189 lb (85.7 kg)   03/12/20 182 lb (82.6 kg)   12/19/19 185 lb (83.9 kg)     BP Readings from Last 3 Encounters:   10/07/20 124/82   03/12/20 124/80   12/19/19 132/86     There is no height or weight on file to calculate BMI. Constitutional: Patient appears well-developed and well-nourished. No distress. Head: Normocephalic and atraumatic. Skin: No rash or erythema. Psychiatric: Normal mood and affect.  Behavior is normal.

## 2021-10-06 ENCOUNTER — VIRTUAL VISIT (OUTPATIENT)
Dept: INTERNAL MEDICINE CLINIC | Age: 41
End: 2021-10-06
Payer: COMMERCIAL

## 2021-10-06 DIAGNOSIS — Z79.899 CONTROLLED SUBSTANCE AGREEMENT SIGNED: ICD-10-CM

## 2021-10-06 DIAGNOSIS — F98.8 ATTENTION DEFICIT DISORDER (ADD) WITHOUT HYPERACTIVITY: ICD-10-CM

## 2021-10-06 PROCEDURE — 99212 OFFICE O/P EST SF 10 MIN: CPT | Performed by: INTERNAL MEDICINE

## 2021-10-06 RX ORDER — DEXTROAMPHETAMINE SACCHARATE, AMPHETAMINE ASPARTATE, DEXTROAMPHETAMINE SULFATE AND AMPHETAMINE SULFATE 7.5; 7.5; 7.5; 7.5 MG/1; MG/1; MG/1; MG/1
30 TABLET ORAL 2 TIMES DAILY
Qty: 60 TABLET | Refills: 0 | Status: SHIPPED | OUTPATIENT
Start: 2021-11-13 | End: 2022-01-11 | Stop reason: SDUPTHER

## 2021-10-06 RX ORDER — DEXTROAMPHETAMINE SACCHARATE, AMPHETAMINE ASPARTATE, DEXTROAMPHETAMINE SULFATE AND AMPHETAMINE SULFATE 7.5; 7.5; 7.5; 7.5 MG/1; MG/1; MG/1; MG/1
30 TABLET ORAL 2 TIMES DAILY
Qty: 60 TABLET | Refills: 0 | Status: SHIPPED | OUTPATIENT
Start: 2021-12-13 | End: 2022-01-11 | Stop reason: SDUPTHER

## 2021-10-06 RX ORDER — DEXTROAMPHETAMINE SACCHARATE, AMPHETAMINE ASPARTATE, DEXTROAMPHETAMINE SULFATE AND AMPHETAMINE SULFATE 7.5; 7.5; 7.5; 7.5 MG/1; MG/1; MG/1; MG/1
30 TABLET ORAL 2 TIMES DAILY
Qty: 60 TABLET | Refills: 0 | Status: SHIPPED | OUTPATIENT
Start: 2021-10-13 | End: 2022-01-11 | Stop reason: SDUPTHER

## 2021-10-06 NOTE — PROGRESS NOTES
Pursuant to the emergency declaration under the [address] [facility], [facility] waiver authority and the [employer] and [employer] Act, this Virtual  Video Visit was conducted, with patient's consent, to reduce the patient's risk of exposure to COVID-19 and provide continuity of care. Service is  provided through a video synchronous discussion virtually to substitute for in-person clinic visit with the patient being at home and Dr. Aurora Wilcox being at home. Patient consent to the video visit. Date of Service:  10/6/2021    Chief Complaint:      Chief Complaint   Patient presents with    ADHD       Assessment/Plan:    Adiila Gutiérrez was seen today for adhd. Diagnoses and all orders for this visit:    Attention deficit disorder (ADD) without hyperactivity  -     amphetamine-dextroamphetamine (ADDERALL, 30MG,) 30 MG tablet; Take 1 tablet by mouth 2 times daily for 30 days. -     amphetamine-dextroamphetamine (ADDERALL) 30 MG tablet; Take 1 tablet by mouth 2 times daily for 30 days. -     amphetamine-dextroamphetamine (ADDERALL, 30MG,) 30 MG tablet; Take 1 tablet by mouth 2 times daily for 30 days. Controlled substance agreement signed  -     amphetamine-dextroamphetamine (ADDERALL, 30MG,) 30 MG tablet; Take 1 tablet by mouth 2 times daily for 30 days. -     amphetamine-dextroamphetamine (ADDERALL) 30 MG tablet; Take 1 tablet by mouth 2 times daily for 30 days. -     amphetamine-dextroamphetamine (ADDERALL, 30MG,) 30 MG tablet; Take 1 tablet by mouth 2 times daily for 30 days. Stable and continue on current medications. Return Jan 11 at 8 VV ADD. HPI:  Nata Welch is a 37 y.o. 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  No change in appetite or sleep disturbance.   Hypogonadism:  Stable on testosterone 100 mg IM qweek at [facility] [employer] urologist.    No results found for: Collette Puls  Lab Results   Component Value Date     07/21/2017    K 4.7 07/21/2017     07/21/2017    CO2 26 07/21/2017    BUN 18 07/21/2017    CREATININE 1.2 07/21/2017    GLUCOSE 76 07/21/2017    CALCIUM 9.6 07/21/2017     Lab Results   Component Value Date    CHOL 125 07/21/2017    TRIG 77 07/21/2017    HDL 37 07/21/2017    LDLCALC 73 07/21/2017     Lab Results   Component Value Date    ALT 25 07/21/2017    AST  06/29/2021      Comment:      Scheduled: 6/29/2021     Lab Results   Component Value Date    TSH 1.00 12/11/2013    T4FREE 1.1 12/11/2013     Lab Results   Component Value Date    WBC  06/29/2021      Comment:      Scheduled: 6/29/2021    HGB 16.0 07/21/2017    HCT 46.8 07/21/2017    MCV 94.7 07/21/2017     07/21/2017     No results found for: INR   No results found for: PSA   No results found for: [address].     Patient Active Problem List   Diagnosis    Increased intraocular pressure    Attention deficit disorder (ADD) without hyperactivity    Recurrent genital HSV (herpes simplex virus) infection    Controlled substance agreement signed    Hypogonadism in male    Irritable bowel syndrome with diarrhea       No Known Allergies  Outpatient Medications Marked as Taking for the 10/6/21 encounter (Virtual Visit) with Mike Engel MD   Medication Sig Dispense Refill    amphetamine-dextroamphetamine (ADDERALL, 30MG,) 30 MG tablet Take 1 tablet by mouth 2 times daily for 30 days. 60 tablet 0    acyclovir (ZOVIRAX) 400 MG tablet TAKE 1 TABLET BY MOUTH 3 TIMES A DAY FOR 10 DAYS 30 tablet 0    LUMIGAN 0.01 % SOLN ophthalmic drops 2 times daily   3    TESTOSTERONE IM Inject into the muscle 100 mg inj once a week           Review of Systems: 14 systems were negative except of what was stated on HPI    Nursing note and vitals reviewed. There were no vitals filed for this visit.   Wt Readings from Last 3 Encounters:   10/07/20 189 lb (85.7 kg) 03/12/20 182 lb (82.6 kg)   12/19/19 185 lb (83.9 kg)     BP Readings from Last 3 Encounters:   10/07/20 124/82   03/12/20 124/80   12/19/19 132/86     There is no height or weight on file to calculate BMI. Constitutional: Patient appears well-developed and well-nourished. No distress. Head: Normocephalic and atraumatic. Skin: No rash or erythema. Psychiatric: Normal mood and affect.  Behavior is normal.

## 2021-11-29 DIAGNOSIS — A60.00 GENITAL HERPES SIMPLEX, UNSPECIFIED SITE: ICD-10-CM

## 2021-11-29 RX ORDER — ACYCLOVIR 400 MG/1
TABLET ORAL
Qty: 30 TABLET | Refills: 0 | Status: SHIPPED | OUTPATIENT
Start: 2021-11-29 | End: 2022-05-03

## 2022-01-11 ENCOUNTER — VIRTUAL VISIT (OUTPATIENT)
Dept: INTERNAL MEDICINE CLINIC | Age: 42
End: 2022-01-11
Payer: COMMERCIAL

## 2022-01-11 DIAGNOSIS — F98.8 ATTENTION DEFICIT DISORDER (ADD) WITHOUT HYPERACTIVITY: ICD-10-CM

## 2022-01-11 DIAGNOSIS — Z79.899 CONTROLLED SUBSTANCE AGREEMENT SIGNED: ICD-10-CM

## 2022-01-11 PROCEDURE — 99212 OFFICE O/P EST SF 10 MIN: CPT | Performed by: INTERNAL MEDICINE

## 2022-01-11 RX ORDER — DEXTROAMPHETAMINE SACCHARATE, AMPHETAMINE ASPARTATE, DEXTROAMPHETAMINE SULFATE AND AMPHETAMINE SULFATE 7.5; 7.5; 7.5; 7.5 MG/1; MG/1; MG/1; MG/1
30 TABLET ORAL 2 TIMES DAILY
Qty: 60 TABLET | Refills: 0 | Status: SHIPPED | OUTPATIENT
Start: 2022-01-15 | End: 2022-01-19 | Stop reason: SDUPTHER

## 2022-01-11 RX ORDER — DEXTROAMPHETAMINE SACCHARATE, AMPHETAMINE ASPARTATE, DEXTROAMPHETAMINE SULFATE AND AMPHETAMINE SULFATE 7.5; 7.5; 7.5; 7.5 MG/1; MG/1; MG/1; MG/1
30 TABLET ORAL 2 TIMES DAILY
Qty: 60 TABLET | Refills: 0 | Status: SHIPPED | OUTPATIENT
Start: 2022-02-14 | End: 2022-01-19 | Stop reason: SDUPTHER

## 2022-01-11 RX ORDER — DEXTROAMPHETAMINE SACCHARATE, AMPHETAMINE ASPARTATE, DEXTROAMPHETAMINE SULFATE AND AMPHETAMINE SULFATE 7.5; 7.5; 7.5; 7.5 MG/1; MG/1; MG/1; MG/1
30 TABLET ORAL 2 TIMES DAILY
Qty: 60 TABLET | Refills: 0 | Status: SHIPPED | OUTPATIENT
Start: 2022-03-15 | End: 2022-01-19 | Stop reason: SDUPTHER

## 2022-01-11 NOTE — PROGRESS NOTES
Pursuant to the emergency declaration under the [facility], Counts include 234 beds at the [facility] waiver authority and the [employer] Act, this Virtual  Video Visit was conducted, with patient's consent, to reduce the patient's risk of exposure to COVID-19 and provide continuity of care. Service is  provided through a video synchronous discussion virtually to substitute for in-person clinic visit with the patient being at home and Dr. Zoya Sharp being at home. Patient consent to the video visit. Date of Service:  1/11/2022    Chief Complaint:      Chief Complaint   Patient presents with    ADHD       Assessment/Plan:    Zion Cardenas was seen today for adhd. Diagnoses and all orders for this visit:    Attention deficit disorder (ADD) without hyperactivity  -     amphetamine-dextroamphetamine (ADDERALL, 30MG,) 30 MG tablet; Take 1 tablet by mouth 2 times daily for 30 days. -     amphetamine-dextroamphetamine (ADDERALL) 30 MG tablet; Take 1 tablet by mouth 2 times daily for 30 days. -     amphetamine-dextroamphetamine (ADDERALL, 30MG,) 30 MG tablet; Take 1 tablet by mouth 2 times daily for 30 days. Controlled substance agreement signed  -     amphetamine-dextroamphetamine (ADDERALL, 30MG,) 30 MG tablet; Take 1 tablet by mouth 2 times daily for 30 days. -     amphetamine-dextroamphetamine (ADDERALL) 30 MG tablet; Take 1 tablet by mouth 2 times daily for 30 days. -     amphetamine-dextroamphetamine (ADDERALL, 30MG,) 30 MG tablet; Take 1 tablet by mouth 2 times daily for 30 days. Stable and continue on current medications. Return VV April 12 at 8:20 ADD. HPI:  Cristiana Tovar is a 37 y.o. 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  No change in appetite or sleep disturbance.   Hypogonadism:  Stable on testosterone 100 mg IM qweek at [facility] urologist.    No results found for: Virginia Edwards  Lab Results   Component Value Date     07/21/2017    K 4.7 07/21/2017     07/21/2017    CO2 26 07/21/2017    BUN 18 07/21/2017    CREATININE 1.2 07/21/2017    GLUCOSE 76 07/21/2017    CALCIUM 9.6 07/21/2017     Lab Results   Component Value Date    CHOL 125 07/21/2017    TRIG 77 07/21/2017    HDL 37 07/21/2017    LDLCALC 73 07/21/2017     Lab Results   Component Value Date    ALT 25 07/21/2017    AST  06/29/2021      Comment:      Scheduled: 6/29/2021     Lab Results   Component Value Date    TSH 1.00 12/11/2013    T4FREE 1.1 12/11/2013     Lab Results   Component Value Date    WBC  06/29/2021      Comment:      Scheduled: 6/29/2021    HGB 16.0 07/21/2017    HCT 46.8 07/21/2017    MCV 94.7 07/21/2017     07/21/2017     No results found for: INR   No results found for: PSA   No results found for: [address].     Patient Active Problem List   Diagnosis    Increased intraocular pressure    Attention deficit disorder (ADD) without hyperactivity    Recurrent genital HSV (herpes simplex virus) infection    Controlled substance agreement signed    Hypogonadism in male    Irritable bowel syndrome with diarrhea       No Known Allergies  Outpatient Medications Marked as Taking for the 1/11/22 encounter (Virtual Visit) with Daniel Engel MD   Medication Sig Dispense Refill    acyclovir (ZOVIRAX) 400 MG tablet TAKE 1 TABLET BY MOUTH 3 TIMES A DAY FOR 10 DAYS 30 tablet 0    amphetamine-dextroamphetamine (ADDERALL, 30MG,) 30 MG tablet Take 1 tablet by mouth 2 times daily for 30 days. 60 tablet 0    LUMIGAN 0.01 % SOLN ophthalmic drops 2 times daily   3    TESTOSTERONE IM Inject into the muscle 100 mg inj once a week           Review of Systems: 14 systems were negative except of what was stated on HPI    Nursing note and vitals reviewed. There were no vitals filed for this visit.   Wt Readings from Last 3 Encounters:   10/07/20 189 lb (85.7 kg)

## 2022-01-19 DIAGNOSIS — F98.8 ATTENTION DEFICIT DISORDER (ADD) WITHOUT HYPERACTIVITY: ICD-10-CM

## 2022-01-19 DIAGNOSIS — Z79.899 CONTROLLED SUBSTANCE AGREEMENT SIGNED: ICD-10-CM

## 2022-01-19 RX ORDER — DEXTROAMPHETAMINE SACCHARATE, AMPHETAMINE ASPARTATE, DEXTROAMPHETAMINE SULFATE AND AMPHETAMINE SULFATE 7.5; 7.5; 7.5; 7.5 MG/1; MG/1; MG/1; MG/1
30 TABLET ORAL 2 TIMES DAILY
Qty: 60 TABLET | Refills: 0 | Status: SHIPPED | OUTPATIENT
Start: 2022-02-14 | End: 2022-04-12 | Stop reason: SDUPTHER

## 2022-01-19 RX ORDER — DEXTROAMPHETAMINE SACCHARATE, AMPHETAMINE ASPARTATE, DEXTROAMPHETAMINE SULFATE AND AMPHETAMINE SULFATE 7.5; 7.5; 7.5; 7.5 MG/1; MG/1; MG/1; MG/1
30 TABLET ORAL 2 TIMES DAILY
Qty: 60 TABLET | Refills: 0 | Status: SHIPPED | OUTPATIENT
Start: 2022-03-14 | End: 2022-04-12 | Stop reason: SDUPTHER

## 2022-01-19 RX ORDER — DEXTROAMPHETAMINE SACCHARATE, AMPHETAMINE ASPARTATE, DEXTROAMPHETAMINE SULFATE AND AMPHETAMINE SULFATE 7.5; 7.5; 7.5; 7.5 MG/1; MG/1; MG/1; MG/1
30 TABLET ORAL 2 TIMES DAILY
Qty: 60 TABLET | Refills: 0 | Status: CANCELLED | OUTPATIENT
Start: 2022-02-14 | End: 2022-03-16

## 2022-01-19 RX ORDER — DEXTROAMPHETAMINE SACCHARATE, AMPHETAMINE ASPARTATE, DEXTROAMPHETAMINE SULFATE AND AMPHETAMINE SULFATE 7.5; 7.5; 7.5; 7.5 MG/1; MG/1; MG/1; MG/1
30 TABLET ORAL 2 TIMES DAILY
Qty: 60 TABLET | Refills: 0 | Status: SHIPPED | OUTPATIENT
Start: 2022-01-19 | End: 2022-04-12 | Stop reason: SDUPTHER

## 2022-01-19 NOTE — TELEPHONE ENCOUNTER
Spoke with [facility] pharmacy and cancelled all remaining Adderall prescriptions on file. Called patient and informed him medication was resent to [facility] as requested.

## 2022-01-19 NOTE — TELEPHONE ENCOUNTER
Patient states he no longer uses [facility] and wants her Adderall switched to a different pharmacy. He tried to have [facility] transfer it but they can't do to it being a controlled substance. I will call [facility] to cancel remaining scripts. New rx pended for adderall at [facility].

## 2022-04-12 ENCOUNTER — TELEMEDICINE (OUTPATIENT)
Dept: INTERNAL MEDICINE CLINIC | Age: 42
End: 2022-04-12
Payer: COMMERCIAL

## 2022-04-12 DIAGNOSIS — Z79.899 CONTROLLED SUBSTANCE AGREEMENT SIGNED: ICD-10-CM

## 2022-04-12 DIAGNOSIS — F98.8 ATTENTION DEFICIT DISORDER (ADD) WITHOUT HYPERACTIVITY: ICD-10-CM

## 2022-04-12 PROCEDURE — 99213 OFFICE O/P EST LOW 20 MIN: CPT | Performed by: INTERNAL MEDICINE

## 2022-04-12 RX ORDER — DEXTROAMPHETAMINE SACCHARATE, AMPHETAMINE ASPARTATE, DEXTROAMPHETAMINE SULFATE AND AMPHETAMINE SULFATE 7.5; 7.5; 7.5; 7.5 MG/1; MG/1; MG/1; MG/1
30 TABLET ORAL 2 TIMES DAILY
Qty: 60 TABLET | Refills: 0 | Status: SHIPPED | OUTPATIENT
Start: 2022-06-23 | End: 2022-07-23

## 2022-04-12 RX ORDER — DEXTROAMPHETAMINE SACCHARATE, AMPHETAMINE ASPARTATE, DEXTROAMPHETAMINE SULFATE AND AMPHETAMINE SULFATE 7.5; 7.5; 7.5; 7.5 MG/1; MG/1; MG/1; MG/1
30 TABLET ORAL 2 TIMES DAILY
Qty: 60 TABLET | Refills: 0 | Status: SHIPPED | OUTPATIENT
Start: 2022-04-23 | End: 2022-05-23

## 2022-04-12 RX ORDER — DEXTROAMPHETAMINE SACCHARATE, AMPHETAMINE ASPARTATE, DEXTROAMPHETAMINE SULFATE AND AMPHETAMINE SULFATE 7.5; 7.5; 7.5; 7.5 MG/1; MG/1; MG/1; MG/1
30 TABLET ORAL 2 TIMES DAILY
Qty: 60 TABLET | Refills: 0 | Status: SHIPPED | OUTPATIENT
Start: 2022-05-23 | End: 2022-06-22

## 2022-04-12 NOTE — PROGRESS NOTES
Pursuant to the emergency declaration under the [address], [facility] waiver authority and the [employer] and [employer] Act, this Virtual  Video Visit was conducted, with patient's consent, to reduce the patient's risk of exposure to COVID-19 and provide continuity of care. Service is  provided through a video synchronous discussion virtually to substitute for in-person clinic visit with the patient being at home and Dr. Annabelle Reyes being at home. Patient consent to the video visit. Date of Service:  4/12/2022    Chief Complaint:      Chief Complaint   Patient presents with    ADHD     follow up and med refill    Other     possible med refills for acyclovir       Assessment/Plan:    Anna Rodriguez was seen today for adhd and other. Diagnoses and all orders for this visit:    Attention deficit disorder (ADD) without hyperactivity  -     amphetamine-dextroamphetamine (ADDERALL, 30MG,) 30 MG tablet; Take 1 tablet by mouth 2 times daily for 30 days. -     amphetamine-dextroamphetamine (ADDERALL) 30 MG tablet; Take 1 tablet by mouth 2 times daily for 30 days. -     amphetamine-dextroamphetamine (ADDERALL, 30MG,) 30 MG tablet; Take 1 tablet by mouth 2 times daily for 30 days. Controlled substance agreement signed  -     amphetamine-dextroamphetamine (ADDERALL, 30MG,) 30 MG tablet; Take 1 tablet by mouth 2 times daily for 30 days. -     amphetamine-dextroamphetamine (ADDERALL) 30 MG tablet; Take 1 tablet by mouth 2 times daily for 30 days. -     amphetamine-dextroamphetamine (ADDERALL, 30MG,) 30 MG tablet; Take 1 tablet by mouth 2 times daily for 30 days. Stable and continue on current medications. Return July 20 at 8:10 Fasting Physical and ADD f/u. HPI:  Mike Alanis is a 40 y.o.       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 [employer] change in appetite or sleep disturbance. Hypogonadism:  Stable on testosterone 100 mg IM qweek at home per urologist.    No results found for: Alvaro Mast  Lab Results   Component Value Date     07/21/2017    K 4.7 07/21/2017     07/21/2017    CO2 26 07/21/2017    BUN 18 07/21/2017    CREATININE 1.2 07/21/2017    GLUCOSE 76 07/21/2017    CALCIUM 9.6 07/21/2017     Lab Results   Component Value Date    CHOL 125 07/21/2017    TRIG 77 07/21/2017    HDL 37 07/21/2017    LDLCALC 73 07/21/2017     Lab Results   Component Value Date    ALT 25 07/21/2017    AST  06/29/2021      Comment:      Scheduled: 6/29/2021     Lab Results   Component Value Date    TSH 1.00 12/11/2013    T4FREE 1.1 12/11/2013     Lab Results   Component Value Date    WBC  06/29/2021      Comment:      Scheduled: 6/29/2021    HGB 16.0 07/21/2017    HCT 46.8 07/21/2017    MCV 94.7 07/21/2017     07/21/2017     No results found for: INR   No results found for: PSA   No results found for: Idelia Noon     Patient Active Problem List   Diagnosis    Increased intraocular pressure    Attention deficit disorder (ADD) without hyperactivity    Recurrent genital HSV (herpes simplex virus) infection    Controlled substance agreement signed    Hypogonadism in male    Irritable bowel syndrome with diarrhea       No Known Allergies  Outpatient Medications Marked as Taking for the 4/12/22 encounter (Telemedicine) with Matheus Engel MD   Medication Sig Dispense Refill    amphetamine-dextroamphetamine (ADDERALL, 30MG,) 30 MG tablet Take 1 tablet by mouth 2 times daily for 30 days. 60 tablet 0    acyclovir (ZOVIRAX) 400 MG tablet TAKE 1 TABLET BY MOUTH 3 TIMES A DAY FOR 10 DAYS 30 tablet 0    LUMIGAN 0.01 % SOLN ophthalmic drops 2 times daily   3    TESTOSTERONE IM Inject into the muscle 100 mg inj once a week           Review of Systems: 14 systems were negative except of what was stated on HPI    Nursing note and vitals reviewed.   There were no vitals filed for this visit. Wt Readings from Last 3 Encounters:   10/07/20 189 lb (85.7 kg)   03/12/20 182 lb (82.6 kg)   12/19/19 185 lb (83.9 kg)     BP Readings from Last 3 Encounters:   10/07/20 124/82   03/12/20 124/80   12/19/19 132/86     There is no height or weight on file to calculate BMI. Constitutional: Patient appears well-developed and well-nourished. No distress. Head: Normocephalic and atraumatic. Psychiatric: Normal mood and affect.  Behavior is normal.

## 2022-05-03 DIAGNOSIS — A60.00 GENITAL HERPES SIMPLEX, UNSPECIFIED SITE: ICD-10-CM

## 2022-05-03 RX ORDER — ACYCLOVIR 400 MG/1
TABLET ORAL
Qty: 30 TABLET | Refills: 0 | Status: SHIPPED | OUTPATIENT
Start: 2022-05-03 | End: 2022-11-04

## 2022-05-11 ENCOUNTER — TELEPHONE (OUTPATIENT)
Dept: INTERNAL MEDICINE CLINIC | Age: 42
End: 2022-05-11

## 2022-05-11 NOTE — TELEPHONE ENCOUNTER
Patient already spoke with our billing dept and they have referred him to speak with  about the coding for these visits being different. (VV for ADD f/up). They were unable to help him. (Self Pay) Patient received bill for his  1- Virtual visit for   $84.00  He received a bill for his 4- Virtual visit for $134.00. He states the visits were for the same thing. He is asking for an explanation, and to see if changes can be made to lower the cost of the 134.00 visit?   Please advise

## 2022-11-02 SDOH — HEALTH STABILITY: PHYSICAL HEALTH: ON AVERAGE, HOW MANY DAYS PER WEEK DO YOU ENGAGE IN MODERATE TO STRENUOUS EXERCISE (LIKE A BRISK WALK)?: 3 DAYS

## 2022-11-02 SDOH — HEALTH STABILITY: PHYSICAL HEALTH: ON AVERAGE, HOW MANY MINUTES DO YOU ENGAGE IN EXERCISE AT THIS LEVEL?: 60 MIN

## 2022-11-04 ENCOUNTER — TELEPHONE (OUTPATIENT)
Dept: FAMILY MEDICINE CLINIC | Age: 42
End: 2022-11-04

## 2022-11-04 ENCOUNTER — OFFICE VISIT (OUTPATIENT)
Dept: FAMILY MEDICINE CLINIC | Age: 42
End: 2022-11-04
Payer: COMMERCIAL

## 2022-11-04 VITALS
BODY MASS INDEX: 30.51 KG/M2 | HEART RATE: 57 BPM | WEIGHT: 206 LBS | OXYGEN SATURATION: 98 % | SYSTOLIC BLOOD PRESSURE: 110 MMHG | HEIGHT: 69 IN | DIASTOLIC BLOOD PRESSURE: 70 MMHG

## 2022-11-04 DIAGNOSIS — F98.8 ATTENTION DEFICIT DISORDER (ADD) WITHOUT HYPERACTIVITY: Primary | ICD-10-CM

## 2022-11-04 DIAGNOSIS — Z13.1 SCREENING FOR DIABETES MELLITUS: ICD-10-CM

## 2022-11-04 DIAGNOSIS — Z13.220 SCREENING FOR HYPERLIPIDEMIA: ICD-10-CM

## 2022-11-04 LAB
CHOLESTEROL, FASTING: 163 MG/DL (ref 0–199)
HDLC SERPL-MCNC: 27 MG/DL (ref 40–60)
LDL CHOLESTEROL CALCULATED: 104 MG/DL
TRIGLYCERIDE, FASTING: 158 MG/DL (ref 0–150)
VLDLC SERPL CALC-MCNC: 32 MG/DL

## 2022-11-04 PROCEDURE — 99213 OFFICE O/P EST LOW 20 MIN: CPT | Performed by: STUDENT IN AN ORGANIZED HEALTH CARE EDUCATION/TRAINING PROGRAM

## 2022-11-04 RX ORDER — TIMOLOL MALEATE 5 MG/ML
SOLUTION/ DROPS OPHTHALMIC
COMMUNITY
Start: 2022-08-14

## 2022-11-04 RX ORDER — ATOMOXETINE 40 MG/1
40 CAPSULE ORAL DAILY
Qty: 30 CAPSULE | Refills: 2 | Status: SHIPPED | OUTPATIENT
Start: 2022-11-04 | End: 2022-11-18 | Stop reason: SINTOL

## 2022-11-04 RX ORDER — LATANOPROST 50 UG/ML
SOLUTION/ DROPS OPHTHALMIC
COMMUNITY
End: 2022-11-04

## 2022-11-04 ASSESSMENT — ENCOUNTER SYMPTOMS
SHORTNESS OF BREATH: 0
ABDOMINAL PAIN: 0
SORE THROAT: 0
COUGH: 0

## 2022-11-04 ASSESSMENT — PATIENT HEALTH QUESTIONNAIRE - PHQ9
SUM OF ALL RESPONSES TO PHQ QUESTIONS 1-9: 0
SUM OF ALL RESPONSES TO PHQ QUESTIONS 1-9: 0
1. LITTLE INTEREST OR PLEASURE IN DOING THINGS: 0
SUM OF ALL RESPONSES TO PHQ QUESTIONS 1-9: 0
2. FEELING DOWN, DEPRESSED OR HOPELESS: 0
SUM OF ALL RESPONSES TO PHQ QUESTIONS 1-9: 0
SUM OF ALL RESPONSES TO PHQ9 QUESTIONS 1 & 2: 0

## 2022-11-04 NOTE — TELEPHONE ENCOUNTER
Called pharmacy where Strattera had been prescribed. Inquired with pharmacist about nonstimulant ADHD medication approved for people with glaucoma. Pharmacist clarified that Strattera is only contraindicated in those with narrow angle glaucoma, which she has significantly more rare than open angle glaucoma. Given this information, we should contact the patient to find out if he has narrow angle glaucoma or open-angle glaucoma or find out who his ophthalmologist is to asked them this question. This is all because narrow angle glaucoma greatly restricts her nonstimulant medication options.

## 2022-11-04 NOTE — TELEPHONE ENCOUNTER
Patient stated he has glaucoma and was prescribed Strattera today. Pharmacist said patient should ask for a substitute mediation. He should not take medication since he has glaucoma. Patient requesting a substitute med. Please advise.

## 2022-11-04 NOTE — TELEPHONE ENCOUNTER
Spoke to pt and he is unaware of which type he has. He did have his opthalmologic number. He sees Dr. Mat Maki at [facility]. The number is [phone].  I informed him that we would call on Monday

## 2022-11-05 LAB
ESTIMATED AVERAGE GLUCOSE: 96.8 MG/DL
HBA1C MFR BLD: 5 %

## 2022-11-07 NOTE — TELEPHONE ENCOUNTER
Medical records at [facility] states that the pt's chart lists only pigmentary glaucoma and no other type is listed.

## 2022-11-07 NOTE — TELEPHONE ENCOUNTER
Pigmentary glaucoma is a type of secondary open-angle glaucoma.  So if patient only has pigmentary glaucoma, this is not a narrow angle glaucoma and therefore not contraindicated for patient to take Strattera

## 2022-11-17 DIAGNOSIS — A60.00 RECURRENT GENITAL HSV (HERPES SIMPLEX VIRUS) INFECTION: Primary | ICD-10-CM

## 2022-11-17 RX ORDER — ACYCLOVIR 400 MG/1
400 TABLET ORAL 3 TIMES DAILY
Qty: 30 TABLET | Refills: 0 | Status: SHIPPED | OUTPATIENT
Start: 2022-11-17 | End: 2022-11-27

## 2022-11-18 DIAGNOSIS — F98.8 ATTENTION DEFICIT DISORDER (ADD) WITHOUT HYPERACTIVITY: Primary | ICD-10-CM

## 2022-11-22 ENCOUNTER — TELEPHONE (OUTPATIENT)
Dept: ADMINISTRATIVE | Age: 42
End: 2022-11-22

## 2022-12-13 ENCOUNTER — OFFICE VISIT (OUTPATIENT)
Dept: FAMILY MEDICINE CLINIC | Age: 42
End: 2022-12-13
Payer: COMMERCIAL

## 2022-12-13 VITALS
DIASTOLIC BLOOD PRESSURE: 74 MMHG | HEIGHT: 69 IN | HEART RATE: 78 BPM | BODY MASS INDEX: 29.47 KG/M2 | WEIGHT: 199 LBS | SYSTOLIC BLOOD PRESSURE: 112 MMHG | OXYGEN SATURATION: 98 %

## 2022-12-13 DIAGNOSIS — A60.00 RECURRENT GENITAL HSV (HERPES SIMPLEX VIRUS) INFECTION: ICD-10-CM

## 2022-12-13 DIAGNOSIS — F98.8 ATTENTION DEFICIT DISORDER (ADD) WITHOUT HYPERACTIVITY: Primary | ICD-10-CM

## 2022-12-13 PROCEDURE — 99214 OFFICE O/P EST MOD 30 MIN: CPT | Performed by: STUDENT IN AN ORGANIZED HEALTH CARE EDUCATION/TRAINING PROGRAM

## 2022-12-13 RX ORDER — ACYCLOVIR 400 MG/1
400 TABLET ORAL 3 TIMES DAILY
Qty: 30 TABLET | Refills: 5 | Status: SHIPPED | OUTPATIENT
Start: 2022-12-13 | End: 2023-02-11

## 2022-12-13 SDOH — ECONOMIC STABILITY: FOOD INSECURITY: WITHIN THE PAST 12 MONTHS, YOU WORRIED THAT YOUR FOOD WOULD RUN OUT BEFORE YOU GOT MONEY TO BUY MORE.: NEVER TRUE

## 2022-12-13 SDOH — ECONOMIC STABILITY: FOOD INSECURITY: WITHIN THE PAST 12 MONTHS, THE FOOD YOU BOUGHT JUST DIDN'T LAST AND YOU DIDN'T HAVE MONEY TO GET MORE.: NEVER TRUE

## 2022-12-13 ASSESSMENT — SOCIAL DETERMINANTS OF HEALTH (SDOH): HOW HARD IS IT FOR YOU TO PAY FOR THE VERY BASICS LIKE FOOD, HOUSING, MEDICAL CARE, AND HEATING?: NOT HARD AT ALL

## 2022-12-13 NOTE — PROGRESS NOTES
Emiliana Joyner (: 1980) is a 40 y.o. male is here for evaluation of the following chief complaint(s): ADD (1 month follow up )    Assessment/Plan:   1. Attention deficit disorder (ADD) without hyperactivity  -     lisdexamfetamine (VYVANSE) 30 MG capsule; Take 1 capsule by mouth every morning for 30 days. , Disp-30 capsule, R-0Normal  -     lisdexamfetamine (VYVANSE) 30 MG capsule; Take 1 capsule by mouth every morning for 30 days. , Disp-30 capsule, R-0Normal  -     lisdexamfetamine (VYVANSE) 30 MG capsule; Take 1 capsule by mouth every morning for 30 days. , Disp-30 capsule, R-0Normal  2. Recurrent genital HSV (herpes simplex virus) infection  -     acyclovir (ZOVIRAX) 400 MG tablet; Take 1 tablet by mouth 3 times daily, Disp-30 tablet, R-5Normal  Return in 3 months (on 3/13/2023). Above conditions are considered to be 2, stable versus 1 stable and 1 unstable chronic illness with prescription management. Patient to follow-up in 3 months. Subjective/Objective:   Since last visit: At the last office visit, patient was started on Strattera. Patient did not tolerate this well, stated it affected his mood. We then started patient on Vyvanse 30 mg daily. Patient states that his ability to focus has improved on the Vyvanse, but it is not perfect. He does report however that he does not want to change the dose of Vyvanse that he is on at this time. He tends to take his medication on weekdays, still has an appetite. He notices that it wears off by 3:00-4:00 PM.  It is not keeping him up at night. He denies anxiety, jitters, palpitations.  has been reviewed. Patient also has a history of recurrent genital HSV infection outbreaks. Patient would like to have this medication on hand, with possible refills, so that he is always ready to treat the symptoms at first presentation. Patient reports occurrences of 2-3 times per year.       /74   Pulse 78   Ht 5' 9\" (1.753 m)   Wt 199 lb (90.3 kg)

## 2022-12-21 NOTE — TELEPHONE ENCOUNTER
Patient returned call to office. He states he does have [employer] benefits insurance. His card states pharmacy is thru [facility] , PCN [id number].  Group # [id number]. [id number]  Patient will try and upload card into Epic

## 2022-12-21 NOTE — TELEPHONE ENCOUNTER
Per patient Plan Cannot find matching patient. Please reach out to patient for NEW PRESCRIPTION coverage.      Thank you

## 2023-01-12 NOTE — TELEPHONE ENCOUNTER
Pt called to check on status of PA. Stated he is still not receiving his medication. Uploaded allied benefits card 12/27.      Please advise

## 2023-01-13 ENCOUNTER — TELEPHONE (OUTPATIENT)
Dept: ADMINISTRATIVE | Age: 43
End: 2023-01-13

## 2023-02-07 DIAGNOSIS — F98.8 ATTENTION DEFICIT DISORDER (ADD) WITHOUT HYPERACTIVITY: Primary | ICD-10-CM

## 2023-02-07 RX ORDER — DEXMETHYLPHENIDATE HYDROCHLORIDE 20 MG/1
20 CAPSULE, EXTENDED RELEASE ORAL DAILY
Qty: 30 CAPSULE | Refills: 0 | Status: SHIPPED | OUTPATIENT
Start: 2023-02-07 | End: 2023-03-09

## 2023-02-07 NOTE — TELEPHONE ENCOUNTER
Pt would like to try a different med. Looks like on the denial letter for vyvanse, pt needs to do a med that is extended release first. See letter in media for meds. Pt did ask for it not to be adderall as he felt that he really became addicted to the plain adderall when he was on that before.

## 2023-02-07 NOTE — TELEPHONE ENCOUNTER
If insurance is unwilling to pay for Vyvanse then our options are:    Patient pays cash for Vyvanse prescription or we switch to a different stimulant that is covered by his insurance.

## 2023-03-10 ENCOUNTER — TELEMEDICINE (OUTPATIENT)
Dept: FAMILY MEDICINE CLINIC | Age: 43
End: 2023-03-10
Payer: COMMERCIAL

## 2023-03-10 DIAGNOSIS — F98.8 ATTENTION DEFICIT DISORDER (ADD) WITHOUT HYPERACTIVITY: Primary | ICD-10-CM

## 2023-03-10 PROBLEM — S63.091A SUBLUXATION OF RIGHT EXTENSOR CARPI ULNARIS TENDON: Status: ACTIVE | Noted: 2021-11-15

## 2023-03-10 PROBLEM — B97.7 HPV IN MALE: Status: ACTIVE | Noted: 2021-10-28

## 2023-03-10 PROBLEM — E34.9 TESTOSTERONE DEFICIENCY: Status: ACTIVE | Noted: 2021-10-28

## 2023-03-10 PROBLEM — H21.239 PIGMENT DISPERSION SYNDROME: Status: ACTIVE | Noted: 2021-10-28

## 2023-03-10 PROBLEM — B00.9 HSV INFECTION: Status: ACTIVE | Noted: 2021-10-28

## 2023-03-10 PROBLEM — R10.2 SUPRAPUBIC DISCOMFORT: Status: ACTIVE | Noted: 2021-10-28

## 2023-03-10 PROBLEM — R35.0 URINARY FREQUENCY: Status: ACTIVE | Noted: 2021-10-28

## 2023-03-10 PROBLEM — M77.8 RIGHT WRIST TENDONITIS: Status: ACTIVE | Noted: 2021-11-15

## 2023-03-10 PROCEDURE — 99214 OFFICE O/P EST MOD 30 MIN: CPT | Performed by: STUDENT IN AN ORGANIZED HEALTH CARE EDUCATION/TRAINING PROGRAM

## 2023-03-10 RX ORDER — DEXTROAMPHETAMINE SACCHARATE, AMPHETAMINE ASPARTATE, DEXTROAMPHETAMINE SULFATE AND AMPHETAMINE SULFATE 7.5; 7.5; 7.5; 7.5 MG/1; MG/1; MG/1; MG/1
30 TABLET ORAL DAILY
COMMUNITY
End: 2023-03-10

## 2023-03-10 RX ORDER — DEXMETHYLPHENIDATE HYDROCHLORIDE 20 MG/1
20 CAPSULE, EXTENDED RELEASE ORAL DAILY
COMMUNITY
End: 2023-03-10

## 2023-03-10 RX ORDER — METHYLPHENIDATE HYDROCHLORIDE 27 MG/1
27 TABLET ORAL DAILY
Qty: 30 TABLET | Refills: 0 | Status: SHIPPED | OUTPATIENT
Start: 2023-03-10 | End: 2023-04-09

## 2023-03-10 NOTE — PROGRESS NOTES
Patricia Atkins (: 1980) is a 44 y.o. male is here for evaluation of the following chief complaint(s): Follow-up (Med follow up)    Assessment/Plan:   1. Attention deficit disorder (ADD) without hyperactivity  -     methylphenidate (CONCERTA) 27 MG extended release tablet; Take 1 tablet by mouth daily for 30 days. Max Daily Amount: 27 mg, Disp-30 tablet, R-0Normal  Has failed Adderall/ Adderall extended release in the past.  Has now failed Focalin. We will trial Concerta. Follow-up in 1 month. No follow-ups on file. Subjective/Objective:   Since last visit: Patient reports that the Focalin extended release does not really seem to be helping at all. Patient has noticed only minimal difference and reports that he believes that he is having an inadequate response to this medication. Medication compliance: all of the time. Side effects from medication include: dizziness and palpitations.  has been reviewed. Patient is somewhat frustrated because he has tried Adderall and Adderall extended release in the past, which patient had failed due to making some symptoms worse and him having an inadequate response to this medication. Patient has now tried Focalin extended release for 30 days and has not noticed an adequate response and has had some dizziness and palpitation side effects. Now patient is hoping to try something else to see if it will have an overall positive effect on him. .         Patient-Reported Vitals: None  No data recorded     On this date 3/10/2023 I have spent 30 minutes reviewing previous notes, test results and face to face with the patient discussing the diagnosis and importance of compliance with the treatment plan as well as documenting on the day of the visit. Patricia Atkins, was evaluated through a synchronous (real-time) audio-video encounter. The patient (or guardian if applicable) is aware that this is a billable service, which includes applicable co-pays.  This Virtual Visit was conducted with patient's (and/or legal guardian's) consent. The visit was conducted pursuant to the emergency declaration under the [facility], [address] [place] [address] authority and the [employer] Act. Patient identification was verified, and a caregiver was present when appropriate. The patient was located at Home: [place] [address]  Provider was located at [facility] ([address] [place], [place] [address]t): [facility] [place] [facility]         An [address] [facility], [facility] was used to authenticate this note.   -- Carlos Toney MD

## 2023-03-20 NOTE — PROGRESS NOTES
1.  Do not eat or drink anything after 12 midnight prior to surgery. This includes no water, chewing gum or mints. 2.  Take the following pills with a small sip of water on the morning of surgery. 3.  Aspirin, Ibuprofen, Advil, Naproxen, Vitamin E and other Anti-inflammatory products should be stopped for 5 days before surgery or as directed by your physician. 4.  Check with your doctor regarding stopping Plavix, Coumadin, Lovenox, Fragmin or other blood thinners. 5.  Do not smoke and do not drink alcoholic beverages 24 hours prior to surgery. This includes NA Beer. 6.  You may brush your teeth and gargle the morning of surgery. DO NOT SWALLOW WATER.  7.  You MUST make arrangements for a responsible adult to take you home after your surgery. You will not be allowed to leave alone or drive yourself home. It is strongly suggested someone stay with you the first 24 hours. Your surgery will be cancelled if you do not have a ride home. 8.  A parent/legal guardian must accompany a child scheduled for surgery and plan to stay at the hospital until the child is discharged. Please do not bring other children with you. 9.  Please wear simple, loose fitting clothing to the hospital.  Jasen Saabsailaja not bring valuables ( money, credit cards, checkbooks, etc.)  Do not wear any makeup (including no eye makeup) or nail polish on your fingers or toes. 10.  Do not wear any jewelry or piercing on the day of surgery. All body piercing jewelry must be removed. 11.  If you have dentures, they will be removed before going to the OR; we will provide you a container. If you wear contact lenses or glasses, they will be removed; please bring a case for them. 12.  Please see your family doctor/pediatrician for a history & physical and/or concerning medications. Bring any test results/reports from your physician's office the day of surgery. 15.  Remember to bring Blood Bank Bracelet to the hospital on the day of surgery.   14.  If

## 2023-03-30 ENCOUNTER — TELEPHONE (OUTPATIENT)
Dept: ADMINISTRATIVE | Age: 43
End: 2023-03-30

## 2023-03-30 ENCOUNTER — ANESTHESIA EVENT (OUTPATIENT)
Dept: OPERATING ROOM | Age: 43
End: 2023-03-30
Payer: COMMERCIAL

## 2023-03-31 ENCOUNTER — HOSPITAL ENCOUNTER (OUTPATIENT)
Age: 43
Setting detail: OUTPATIENT SURGERY
Discharge: HOME | End: 2023-03-31
Attending: ORTHOPAEDIC SURGERY | Admitting: ORTHOPAEDIC SURGERY
Payer: COMMERCIAL

## 2023-03-31 ENCOUNTER — ANESTHESIA (OUTPATIENT)
Dept: OPERATING ROOM | Age: 43
End: 2023-03-31
Payer: COMMERCIAL

## 2023-03-31 VITALS
HEIGHT: 68 IN | RESPIRATION RATE: 18 BRPM | DIASTOLIC BLOOD PRESSURE: 83 MMHG | SYSTOLIC BLOOD PRESSURE: 137 MMHG | WEIGHT: 200 LBS | TEMPERATURE: 98.4 F | BODY MASS INDEX: 30.31 KG/M2 | HEART RATE: 64 BPM | OXYGEN SATURATION: 99 %

## 2023-03-31 DIAGNOSIS — S63.642A RUPTURE OF ULNAR COLLATERAL LIGAMENT OF LEFT THUMB, INITIAL ENCOUNTER: Primary | ICD-10-CM

## 2023-03-31 PROCEDURE — 2500000003 HC RX 250 WO HCPCS: Performed by: ORTHOPAEDIC SURGERY

## 2023-03-31 PROCEDURE — 2709999900 HC NON-CHARGEABLE SUPPLY: Performed by: ORTHOPAEDIC SURGERY

## 2023-03-31 PROCEDURE — 3700000000 HC ANESTHESIA ATTENDED CARE: Performed by: ORTHOPAEDIC SURGERY

## 2023-03-31 PROCEDURE — 7100000001 HC PACU RECOVERY - ADDTL 15 MIN: Performed by: ORTHOPAEDIC SURGERY

## 2023-03-31 PROCEDURE — 6360000002 HC RX W HCPCS: Performed by: ORTHOPAEDIC SURGERY

## 2023-03-31 PROCEDURE — 2500000003 HC RX 250 WO HCPCS

## 2023-03-31 PROCEDURE — 2580000003 HC RX 258

## 2023-03-31 PROCEDURE — C1776 JOINT DEVICE (IMPLANTABLE): HCPCS | Performed by: ORTHOPAEDIC SURGERY

## 2023-03-31 PROCEDURE — 6360000002 HC RX W HCPCS

## 2023-03-31 PROCEDURE — 7100000000 HC PACU RECOVERY - FIRST 15 MIN: Performed by: ORTHOPAEDIC SURGERY

## 2023-03-31 PROCEDURE — 7100000010 HC PHASE II RECOVERY - FIRST 15 MIN: Performed by: ORTHOPAEDIC SURGERY

## 2023-03-31 PROCEDURE — 3700000001 HC ADD 15 MINUTES (ANESTHESIA): Performed by: ORTHOPAEDIC SURGERY

## 2023-03-31 PROCEDURE — 7100000011 HC PHASE II RECOVERY - ADDTL 15 MIN: Performed by: ORTHOPAEDIC SURGERY

## 2023-03-31 PROCEDURE — 3600000013 HC SURGERY LEVEL 3 ADDTL 15MIN: Performed by: ORTHOPAEDIC SURGERY

## 2023-03-31 PROCEDURE — 2580000003 HC RX 258: Performed by: ORTHOPAEDIC SURGERY

## 2023-03-31 PROCEDURE — 3600000003 HC SURGERY LEVEL 3 BASE: Performed by: ORTHOPAEDIC SURGERY

## 2023-03-31 DEVICE — KIT CONVENIENCE HND WRST INTERNALBRACE LIG AUG IMPL REP SYS: Type: IMPLANTABLE DEVICE | Site: THUMB | Status: FUNCTIONAL

## 2023-03-31 RX ORDER — SODIUM CHLORIDE 9 MG/ML
INJECTION, SOLUTION INTRAVENOUS PRN
Status: DISCONTINUED | OUTPATIENT
Start: 2023-03-31 | End: 2023-03-31 | Stop reason: HOSPADM

## 2023-03-31 RX ORDER — DEXMETHYLPHENIDATE HYDROCHLORIDE 25 MG/1
CAPSULE, EXTENDED RELEASE ORAL
COMMUNITY

## 2023-03-31 RX ORDER — SODIUM CHLORIDE 0.9 % (FLUSH) 0.9 %
5-40 SYRINGE (ML) INJECTION EVERY 12 HOURS SCHEDULED
Status: DISCONTINUED | OUTPATIENT
Start: 2023-03-31 | End: 2023-03-31 | Stop reason: HOSPADM

## 2023-03-31 RX ORDER — DEXAMETHASONE SODIUM PHOSPHATE 4 MG/ML
INJECTION, SOLUTION INTRA-ARTICULAR; INTRALESIONAL; INTRAMUSCULAR; INTRAVENOUS; SOFT TISSUE PRN
Status: DISCONTINUED | OUTPATIENT
Start: 2023-03-31 | End: 2023-03-31 | Stop reason: SDUPTHER

## 2023-03-31 RX ORDER — SODIUM CHLORIDE 0.9 % (FLUSH) 0.9 %
5-40 SYRINGE (ML) INJECTION PRN
Status: DISCONTINUED | OUTPATIENT
Start: 2023-03-31 | End: 2023-03-31 | Stop reason: HOSPADM

## 2023-03-31 RX ORDER — FENTANYL CITRATE 50 UG/ML
INJECTION, SOLUTION INTRAMUSCULAR; INTRAVENOUS PRN
Status: DISCONTINUED | OUTPATIENT
Start: 2023-03-31 | End: 2023-03-31 | Stop reason: SDUPTHER

## 2023-03-31 RX ORDER — BUPIVACAINE HYDROCHLORIDE 2.5 MG/ML
INJECTION, SOLUTION EPIDURAL; INFILTRATION; INTRACAUDAL
Status: DISCONTINUED
Start: 2023-03-31 | End: 2023-03-31 | Stop reason: HOSPADM

## 2023-03-31 RX ORDER — ONDANSETRON 2 MG/ML
INJECTION INTRAMUSCULAR; INTRAVENOUS PRN
Status: DISCONTINUED | OUTPATIENT
Start: 2023-03-31 | End: 2023-03-31 | Stop reason: SDUPTHER

## 2023-03-31 RX ORDER — OXYCODONE HYDROCHLORIDE 5 MG/1
5 TABLET ORAL PRN
Status: DISCONTINUED | OUTPATIENT
Start: 2023-03-31 | End: 2023-03-31 | Stop reason: HOSPADM

## 2023-03-31 RX ORDER — OXYCODONE HYDROCHLORIDE AND ACETAMINOPHEN 5; 325 MG/1; MG/1
1 TABLET ORAL EVERY 8 HOURS PRN
Qty: 21 TABLET | Refills: 0 | Status: SHIPPED | OUTPATIENT
Start: 2023-03-31 | End: 2023-04-07

## 2023-03-31 RX ORDER — LABETALOL HYDROCHLORIDE 5 MG/ML
5 INJECTION, SOLUTION INTRAVENOUS EVERY 10 MIN PRN
Status: DISCONTINUED | OUTPATIENT
Start: 2023-03-31 | End: 2023-03-31 | Stop reason: HOSPADM

## 2023-03-31 RX ORDER — MEPERIDINE HYDROCHLORIDE 25 MG/ML
12.5 INJECTION INTRAMUSCULAR; INTRAVENOUS; SUBCUTANEOUS EVERY 5 MIN PRN
Status: DISCONTINUED | OUTPATIENT
Start: 2023-03-31 | End: 2023-03-31 | Stop reason: HOSPADM

## 2023-03-31 RX ORDER — SODIUM CHLORIDE, SODIUM LACTATE, POTASSIUM CHLORIDE, CALCIUM CHLORIDE 600; 310; 30; 20 MG/100ML; MG/100ML; MG/100ML; MG/100ML
INJECTION, SOLUTION INTRAVENOUS CONTINUOUS PRN
Status: DISCONTINUED | OUTPATIENT
Start: 2023-03-31 | End: 2023-03-31 | Stop reason: SDUPTHER

## 2023-03-31 RX ORDER — KETOROLAC TROMETHAMINE 30 MG/ML
INJECTION, SOLUTION INTRAMUSCULAR; INTRAVENOUS PRN
Status: DISCONTINUED | OUTPATIENT
Start: 2023-03-31 | End: 2023-03-31 | Stop reason: SDUPTHER

## 2023-03-31 RX ORDER — PROPOFOL 10 MG/ML
INJECTION, EMULSION INTRAVENOUS PRN
Status: DISCONTINUED | OUTPATIENT
Start: 2023-03-31 | End: 2023-03-31 | Stop reason: SDUPTHER

## 2023-03-31 RX ORDER — DIPHENHYDRAMINE HYDROCHLORIDE 50 MG/ML
12.5 INJECTION INTRAMUSCULAR; INTRAVENOUS
Status: DISCONTINUED | OUTPATIENT
Start: 2023-03-31 | End: 2023-03-31 | Stop reason: HOSPADM

## 2023-03-31 RX ORDER — LIDOCAINE HYDROCHLORIDE 10 MG/ML
INJECTION, SOLUTION INFILTRATION; PERINEURAL
Status: DISCONTINUED
Start: 2023-03-31 | End: 2023-03-31 | Stop reason: HOSPADM

## 2023-03-31 RX ORDER — LIDOCAINE HYDROCHLORIDE 10 MG/ML
1 INJECTION, SOLUTION EPIDURAL; INFILTRATION; INTRACAUDAL; PERINEURAL
Status: DISCONTINUED | OUTPATIENT
Start: 2023-03-31 | End: 2023-03-31 | Stop reason: HOSPADM

## 2023-03-31 RX ORDER — LIDOCAINE HYDROCHLORIDE 20 MG/ML
INJECTION, SOLUTION EPIDURAL; INFILTRATION; INTRACAUDAL; PERINEURAL PRN
Status: DISCONTINUED | OUTPATIENT
Start: 2023-03-31 | End: 2023-03-31 | Stop reason: SDUPTHER

## 2023-03-31 RX ORDER — BUPIVACAINE HYDROCHLORIDE 2.5 MG/ML
INJECTION, SOLUTION INFILTRATION; PERINEURAL PRN
Status: DISCONTINUED | OUTPATIENT
Start: 2023-03-31 | End: 2023-03-31 | Stop reason: ALTCHOICE

## 2023-03-31 RX ORDER — OXYCODONE HYDROCHLORIDE 5 MG/1
10 TABLET ORAL PRN
Status: DISCONTINUED | OUTPATIENT
Start: 2023-03-31 | End: 2023-03-31 | Stop reason: HOSPADM

## 2023-03-31 RX ORDER — ONDANSETRON 2 MG/ML
4 INJECTION INTRAMUSCULAR; INTRAVENOUS
Status: DISCONTINUED | OUTPATIENT
Start: 2023-03-31 | End: 2023-03-31 | Stop reason: HOSPADM

## 2023-03-31 RX ORDER — MIDAZOLAM HYDROCHLORIDE 1 MG/ML
INJECTION INTRAMUSCULAR; INTRAVENOUS PRN
Status: DISCONTINUED | OUTPATIENT
Start: 2023-03-31 | End: 2023-03-31 | Stop reason: SDUPTHER

## 2023-03-31 RX ORDER — SODIUM CHLORIDE, SODIUM LACTATE, POTASSIUM CHLORIDE, CALCIUM CHLORIDE 600; 310; 30; 20 MG/100ML; MG/100ML; MG/100ML; MG/100ML
INJECTION, SOLUTION INTRAVENOUS CONTINUOUS
Status: DISCONTINUED | OUTPATIENT
Start: 2023-03-31 | End: 2023-03-31 | Stop reason: HOSPADM

## 2023-03-31 RX ADMIN — FENTANYL CITRATE 50 MCG: 50 INJECTION INTRAMUSCULAR; INTRAVENOUS at 12:05

## 2023-03-31 RX ADMIN — FENTANYL CITRATE 50 MCG: 50 INJECTION INTRAMUSCULAR; INTRAVENOUS at 11:43

## 2023-03-31 RX ADMIN — KETOROLAC TROMETHAMINE 15 MG: 30 INJECTION, SOLUTION INTRAMUSCULAR at 12:08

## 2023-03-31 RX ADMIN — LIDOCAINE HYDROCHLORIDE 100 MG: 20 INJECTION, SOLUTION EPIDURAL; INFILTRATION; INTRACAUDAL; PERINEURAL at 11:37

## 2023-03-31 RX ADMIN — SODIUM CHLORIDE, POTASSIUM CHLORIDE, SODIUM LACTATE AND CALCIUM CHLORIDE: 600; 310; 30; 20 INJECTION, SOLUTION INTRAVENOUS at 12:09

## 2023-03-31 RX ADMIN — PROPOFOL 250 MG: 10 INJECTION, EMULSION INTRAVENOUS at 11:37

## 2023-03-31 RX ADMIN — SODIUM CHLORIDE, POTASSIUM CHLORIDE, SODIUM LACTATE AND CALCIUM CHLORIDE: 600; 310; 30; 20 INJECTION, SOLUTION INTRAVENOUS at 11:33

## 2023-03-31 RX ADMIN — MIDAZOLAM 2 MG: 1 INJECTION INTRAMUSCULAR; INTRAVENOUS at 11:37

## 2023-03-31 RX ADMIN — DEXAMETHASONE SODIUM PHOSPHATE 8 MG: 4 INJECTION, SOLUTION INTRAMUSCULAR; INTRAVENOUS at 11:37

## 2023-03-31 RX ADMIN — CEFAZOLIN 2000 MG: 2 INJECTION, POWDER, FOR SOLUTION INTRAMUSCULAR; INTRAVENOUS at 11:40

## 2023-03-31 RX ADMIN — ONDANSETRON 4 MG: 2 INJECTION INTRAMUSCULAR; INTRAVENOUS at 11:37

## 2023-03-31 ASSESSMENT — PAIN - FUNCTIONAL ASSESSMENT: PAIN_FUNCTIONAL_ASSESSMENT: 0-10

## 2023-03-31 ASSESSMENT — PAIN SCALES - GENERAL: PAINLEVEL_OUTOF10: 1

## 2023-03-31 NOTE — H&P
Department of Orthopedic Surgery  Attending   History and Physical        CHIEF COMPLAINT:  left thumb pain and weakness    Reason for Admission: As Above    History Obtained From:  patient    HISTORY OF PRESENT ILLNESS:      The patient is a 44 y.o. male  who presents with left thumb injury while skiing, +MRI       Past Medical History:        Diagnosis Date    Acromioclavicular joint arthritis 4/18/2016    ADHD (attention deficit hyperactivity disorder) 2005    Anxiety     Irritable bowel syndrome with diarrhea 5/12/2021    Tear of left glenoid labrum 7/27/2015       Past Surgical History:        Procedure Laterality Date    SHOULDER ARTHROSCOPY Left 01/17/2017    WISDOM TOOTH EXTRACTION         Medications Prior to Admission:   Prior to Admission medications    Medication Sig Start Date End Date Taking? Authorizing Provider   Dexmethylphenidate HCl ER (FOCALIN XR) 25 MG CP24 Take by mouth. Yes Historical Provider, MD   methylphenidate (CONCERTA) 27 MG extended release tablet Take 1 tablet by mouth daily for 30 days. Max Daily Amount: 27 mg 3/10/23 4/9/23  Alla Marroquin MD   timolol (TIMOPTIC) 0.5 % ophthalmic solution INSTILL 1 DROP IN BOTH EYES TWICE DAILY 8/14/22   Historical Provider, MD   TESTOSTERONE IM Inject into the muscle 100 mg inj once a week    Historical Provider, MD       Allergies:  Patient has no known allergies. Social History:    Tobacco:  reports that he has never smoked. He has never used smokeless tobacco.   Alcohol:  reports current alcohol use of about 2.0 standard drinks per week.    Illicit Drug: No    Family History:       Problem Relation Age of Onset    Diabetes Mother         diet control    Cancer Father         spleen, bone marrow    Bipolar Disorder Sister     Thyroid Disease Sister     Cancer Paternal Grandmother         colon    Cancer Paternal Grandfather         lung, throat and brain    Diabetes Maternal Aunt     Early Death Maternal Aunt 39    Cancer Maternal Uncle rupture    PLAN:  1)  to OR for open repair left thumb UCL with anchor and internal brace  2)  marked, npo, consented  3)  risks/benefits outlined. All questions and concerns were addressed today. Patient is in agreement with the plan. Kemar Ambriz MD  Hand & Upper Extremity Surgery            Please note that this transcription was created using voice recognition software. Any errors are unintentional and may be due to voice recognition transcription.

## 2023-03-31 NOTE — ANESTHESIA POSTPROCEDURE EVALUATION
Department of Anesthesiology  Postprocedure Note    Patient: Sheri Ann  MRN: [id number]  YOB: 1980  Date of evaluation: 3/31/2023      Procedure Summary     Date: 03/31/23 Room / Location: Mateus Méndez  OR 01 / [facility]    Anesthesia Start: 1133 Anesthesia Stop: 6712    Procedure: LEFT THUMB ULNAR COLLATERAL LIGAMENT REPAIR WITH ANCHORS (Left: Thumb) Diagnosis:       Traumatic rupture of left ulnar collateral ligament, initial encounter      (Traumatic rupture of left ulnar collateral ligament, initial encounter Nisha Sat)    Surgeons: Ras Cordero MD Responsible Provider: Caro Crabtree MD    Anesthesia Type: general ASA Status: 1          Anesthesia Type: No value filed.     Jose Alfredo Phase I: Jose Alfredo Score: 9    Jose Alfredo Phase II: Jose Alfredo Score: 10      Anesthesia Post Evaluation    Comments: Postoperative Anesthesia Note    Name:    Sheri Ann  MRN:      [id number]    Patient Vitals in the past 12 hrs:  03/31/23 1300, BP:137/83, Pulse:64, Resp:18, SpO2:99 %  03/31/23 1252, BP:124/85, Pulse:66, Resp:14, SpO2:99 %  03/31/23 1237, BP:(!) 103/58, Pulse:63, Resp:23, SpO2:97 %  03/31/23 1232, BP:(!) 108/59, Pulse:64, Resp:16, SpO2:96 %  03/31/23 1227, BP:(!) 106/58, Temp:98.4 °F (36.9 °C), Pulse:64, Resp:23, SpO2:97 %  03/31/23 0922, BP:127/80, Temp:98.4 °F (36.9 °C), Temp src:Temporal, Pulse:53, Resp:15, SpO2:98 %, Height:5' 8\" (1.727 m), Weight:200 lb (90.7 kg)     LABS:    CBC  Lab Results       Component                Value               Date/Time                  WBC                                          06/29/2021 08:24 AM     Comment:    Scheduled: 6/29/2021       WBC                      4.6                 07/21/2017 09:17 AM        HGB                      16.0                07/21/2017 09:17 AM        HCT                      46.8                07/21/2017 09:17 AM        PLT                      189                 07/21/2017 09:17 AM   RENAL  Lab

## 2023-03-31 NOTE — BRIEF OP NOTE
Brief Postoperative Note      Patient: Ilda Wang  YOB: 1980  MRN: [id number]    Date of Procedure: 3/31/2023    Pre-Op Diagnosis: Traumatic rupture of left ulnar collateral ligament, initial encounter [S53.32XA]    Post-Op Diagnosis: Same       Procedure(s):  LEFT THUMB ULNAR COLLATERAL LIGAMENT REPAIR WITH ANCHORS  Xray left thumb    Surgeon(s):  Jordi Ross MD    Assistant:  First Assistant: Jordi Ross    Anesthesia: General and local    Estimated Blood Loss (mL): Minimal    Complications: None    Specimens:   * No specimens in log *    Implants:  Implant Name Type Inv.  Item Serial No.  Lot No. LRB No. Used Action   [employer] HND WRST INTERNALBRACE LIG AUG IMPL REP SYS - [id number]  [employer] WRST INTERNALBRACE LIG AUG IMPL REP SYS  [address] [employer]- [id number] 1 Implanted         Drains: * No LDAs found *    Findings: see full op note    Electronically signed by Jordi Ross MD on 3/31/2023 at 12:17 PM

## 2023-03-31 NOTE — PROGRESS NOTES
Pt arrived to PACU, vitals stable, pt asleep from anesthesia. Report given at bedside. Dressing clean dry and intact.

## 2023-03-31 NOTE — DISCHARGE INSTRUCTIONS
Post op Instructions for Upper Extremity Surgery     * Keep dressing dry and clean. It is ok to shower just keep dressing dry. * Elevate operative arm, attempt to be above heart level. * Ice 20 minutes on 20 minutes off, 3 times per day, first 1-2 days postoperatively. * Follow-up appointment as scheduled by office staff. Check paperwork from office. If no appointment scheduled call the office. * If dressing is too tight, you may loosen Ace bandage and leave splint in place. * Sling, as needed  * Light activities, no heavy lifting. * Finger motion encouraged. NO THUMB MOTION   * If you have any questions, refer to the office number listed below. [phone] ([facility]) or [phone] [address])         [id number] Anthony Petty MD  Hand & Upper Extremity Surgery  [facility]    You are under the influence of drugs- do not drink alcohol, drive a car, operate machinery(such as power tools, kitchen appliances, etc), sign legal documents, or make any important decisions for 24 hours (or while on pain medications). Children should not ride bikes or [place] or play on gym sets  for 24 hours after surgery. A responsible adult should be with you for 24 hours. Rest at home today- increase activity as tolerated. Progress slowly to a regular diet unless your physician has instructed you otherwise. Drink plenty of water. CALL YOUR DOCTOR IF YOU:  Have moderate to severe nausea or vomiting AND are unable to hold down fluids or prescribed medications. Have bright red bloody drainage from your dressing that won't stop oozing.   Do not get relief with your pain medication    NORMAL (POSSIBLE) SIDE EFFECTS FROM ANESTHESIA:     Confusion, temporary memory loss, delayed reaction times in the first 24 hours  Lightheadedness, dizziness, difficulty focusing, blurred vision  Nausea/vomiting can happen  Shivering, feeling cold, sore throat, cough and muscle aches should stop within 24-48 hours  Trouble urinating - call your surgeon if it has been more than 8 hrs  Bruising or soreness at the IV site - call if it remains red, firm or there is drainage             FEMALES OF CHILDBEARING AGE WHO ARE TAKING BIRTH CONTROL PILLS:  You may have received a medication during your procedure that interferes with the   actions of birth control pills (Bridion or Emend). Use some other kind of birth control in addition to your pills, like a condom, for 1 month after your procedure to prevent unwanted pregnancy. The following instructions are to be followed if you have a known history or diagnosis of sleep apnea: For all sleep apnea patients:  ? Sleep on your side or sitting up in a chair whenever possible, especially the first 24 hours after surgery. ? Use only medicines prescribed by your doctor. ? Do not drink alcohol. ? If you have a dental device to assist you while at rest, use it at all times for the first 24 hours. For patients using CPAP machines:  ? Use your CPAP machine during all periods of sleep as usual.  ? Use your CPAP machine during all periods of daytime rest while on pain medicines. ** Follow up with your primary care doctor for continued care. IF YOU DO NOT TAKE ALL OF YOUR NARCOTIC PAIN MEDICATION, please dispose of them responsibly. There are drop off boxes in the Emergency Departments 24/7 at both [facility] and [place]. If these locations are not convenient, other options for discarding them can be found at:  [email or web address]    Hospital or office staff may NOT accept any medications to drop off in the cabinet for you.

## 2023-03-31 NOTE — ANESTHESIA PRE PROCEDURE
normal                    Neuro/Psych:   (+) psychiatric history:   (-) seizures and TIA           GI/Hepatic/Renal:        (-) GERD, liver disease and no renal disease       Endo/Other:        (-) diabetes mellitus               Abdominal:             Vascular: Other Findings:           Anesthesia Plan      general     ASA 1     (I discussed with the patient the risks and benefits of PIV, anesthesia, IV Narcotics, PACU. All questions were answered the patient agrees with the plan and wishes to proceed)  Induction: intravenous.                             Benjamín Barber MD   3/31/2023

## 2023-05-02 ENCOUNTER — TELEPHONE (OUTPATIENT)
Dept: ADMINISTRATIVE | Age: 43
End: 2023-05-02

## 2023-05-02 DIAGNOSIS — F90.9 ADULT ADHD: ICD-10-CM

## 2023-05-02 DIAGNOSIS — F90.9 ADULT ADHD: Primary | ICD-10-CM

## 2023-05-09 ENCOUNTER — TELEPHONE (OUTPATIENT)
Dept: FAMILY MEDICINE CLINIC | Age: 43
End: 2023-05-09

## 2023-05-09 NOTE — TELEPHONE ENCOUNTER
Called patient to discuss previous ADHD medications and to give further documentation for previous responses to medications the patient has tried. The first thing that should be noted that the patient has tried at least 5 different types of ADHD medications including: Focalin XR, Concerta extended release, Adderall IR, Adderall XR, and atomoxetine. Regarding Focalin XR, the patient reports that he is both intolerant to this medication and it was ineffective. He had severe dizziness and was not benefiting in regard to his attention from the medication dose that he was on. Regarding Adderall XR, patient took this in February 2015. Both Adderall XR and IR caused patient to have a roller coaster of symptoms, causing anxiety to become worse and thus causing an intolerance to both of these medications. Regarding atomoxetine, the patient found it to be ineffective at controlling his symptoms. Regarding Concerta extended release, the patient reports that he could not find generic of this medication at any pharmacy, so then he paid cash of over $500 to attempt this medication within the past 60 days. He reports that despite being on a moderate dose, he feels like it is ineffective at controlling his attention deficit. Thus patient has had an inadequate response to this medication as well. Given that patient has had either an adequate response and/or intolerance to Focalin XR, Concerta extended release, and Adderall XR, it would be prudent to submit a letter of appeal for the denial of Vyvanse.

## 2023-07-11 DIAGNOSIS — F90.9 ADULT ADHD: ICD-10-CM

## 2023-07-11 NOTE — TELEPHONE ENCOUNTER
Refill  lisdexamfetamine (VYVANSE) 30 MG capsule    Lov- 3/10/23  Future no    Patient was very upset that he had no refills on this medication. He said he was totally out and wanted to  a paper RX right now on his way to work. I explained that  was busy seeing patients and he would get to it as soon as possible he hung up.     [place]

## 2023-07-11 NOTE — TELEPHONE ENCOUNTER
Last Office Visit  -  03/10/2023  Next Office Visit  -  n/a    Last Filled  -  05/02/2023  Last UDS -  n/a  Contract -  n/a

## 2023-08-15 DIAGNOSIS — F90.9 ADULT ADHD: ICD-10-CM

## 2023-08-15 NOTE — TELEPHONE ENCOUNTER
Last Office Visit  -  03/10/2023  Next Office Visit  -  n/a    Last Filled  -  07/11/2023  Last UDS -  n/a  Contract -  n/a

## 2023-09-26 ENCOUNTER — OFFICE VISIT (OUTPATIENT)
Dept: FAMILY MEDICINE CLINIC | Age: 43
End: 2023-09-26
Payer: COMMERCIAL

## 2023-09-26 VITALS
HEIGHT: 68 IN | BODY MASS INDEX: 31.95 KG/M2 | HEART RATE: 69 BPM | OXYGEN SATURATION: 98 % | WEIGHT: 210.8 LBS | SYSTOLIC BLOOD PRESSURE: 132 MMHG | DIASTOLIC BLOOD PRESSURE: 70 MMHG

## 2023-09-26 DIAGNOSIS — F90.9 ADULT ADHD: Primary | ICD-10-CM

## 2023-09-26 PROCEDURE — 99213 OFFICE O/P EST LOW 20 MIN: CPT | Performed by: STUDENT IN AN ORGANIZED HEALTH CARE EDUCATION/TRAINING PROGRAM

## 2023-09-26 RX ORDER — LISDEXAMFETAMINE DIMESYLATE CAPSULES 30 MG/1
30 CAPSULE ORAL DAILY
Qty: 30 CAPSULE | Refills: 0 | Status: SHIPPED | OUTPATIENT
Start: 2023-09-26 | End: 2023-10-26

## 2023-09-26 RX ORDER — LISDEXAMFETAMINE DIMESYLATE CAPSULES 30 MG/1
30 CAPSULE ORAL DAILY
Qty: 30 CAPSULE | Refills: 0 | Status: SHIPPED | OUTPATIENT
Start: 2023-10-26 | End: 2023-11-25

## 2023-09-26 RX ORDER — LISDEXAMFETAMINE DIMESYLATE CAPSULES 30 MG/1
30 CAPSULE ORAL EVERY MORNING
Qty: 30 CAPSULE | Refills: 0 | Status: CANCELLED | OUTPATIENT
Start: 2023-09-26 | End: 2023-10-26

## 2023-09-26 RX ORDER — LISDEXAMFETAMINE DIMESYLATE CAPSULES 30 MG/1
30 CAPSULE ORAL DAILY
Qty: 30 CAPSULE | Refills: 0 | Status: SHIPPED | OUTPATIENT
Start: 2023-11-25 | End: 2023-12-25

## 2023-09-26 ASSESSMENT — PATIENT HEALTH QUESTIONNAIRE - PHQ9
SUM OF ALL RESPONSES TO PHQ9 QUESTIONS 1 & 2: 0
2. FEELING DOWN, DEPRESSED OR HOPELESS: 0
1. LITTLE INTEREST OR PLEASURE IN DOING THINGS: 0
SUM OF ALL RESPONSES TO PHQ QUESTIONS 1-9: 0

## 2023-11-03 DIAGNOSIS — F90.9 ADULT ADHD: ICD-10-CM

## 2023-11-03 RX ORDER — LISDEXAMFETAMINE DIMESYLATE CAPSULES 30 MG/1
30 CAPSULE ORAL DAILY
Qty: 30 CAPSULE | Refills: 0 | Status: SHIPPED | OUTPATIENT
Start: 2023-11-03 | End: 2023-12-03

## 2023-11-03 NOTE — TELEPHONE ENCOUNTER
Last Office Visit  -  9-6-2023  Next Office Visit  -      Last Filled  -    Last UDS -    Contract -        Refill Vyvance 30 mg       Pharmacy [address]

## 2024-04-22 NOTE — OP NOTE
Patient:       Lisseth Monreal  :  1980  DOS:  24        Pre-Op Diagnoses:  1. Left Ulnar collateral ligament tear of the thumb, MCP joint  2.          3.            Post-op Diagnoses:   1.  same  2. Procedure(s) Performed:  1. Open primary repair ulnar collateral ligament of the left thumb, MCP joint, the anchors and internal brace  2. X-ray left thumb, 3 views with intraoperative interpretation by the surgeon      3. Anesthesia Type:   General and local    Blood Loss:   Minimal    Pathology:   None    Complications:   None    Assistant: Mercy SA    Implant: Arthrex UCL kit with internal brace    Surgical site marked in preop holding. Informed consent signed and on the chart. Use of implant outlined. Risks and benefits reviewed. Patient elected to proceed. The patient was brought to the operating and room and placed in the supine position. After the induction of anesthesia a standard time-out was performed. Thumb had instability while testing under anesthesia on the UCL side. Description of the Procedure: The left upper extremity was prepped and draped in the normal sterile fashion, antibiotics were in place and a formal timeout was held. Upper extremity was exsanguinated and the tourniquet inflated to 250 mmHg. A lazy S type incision was made over the ulnar aspect of the MCP joint of the thumb through skin only. Branches of the dorsal sensory radial nerve were identified and gently protected. The adductor aponeurosis was identified. The aponeurosis was released off the radial border leaving a small cuff next to the EPL tendon for repair later. We were able to see the ulnar collateral ligament at this time, it was avulsed off its origin site and had some scar tissue along the ligament itself, mild retraction. Early scar tissue was gently debrided with the microrongeur. There was instability of the joint.   The joint was examined, no loose progressive activities and weaning out of the brace. X-ray left thumb first postoperative visit        Randal Maldonado MD  Hand & Upper Extremity Surgery            Please note that this transcription was created using voice recognition software. Any errors are unintentional and may be due to voice recognition transcription. Counseling Text: Over the counter salicylic acid preparations can be used effectively to treat warts at home. There are two types of application: liquid and plaster. Liquid preparations are applied like nail polish and the plaster applications are applied like a bandage (you may need to apply duct tape over the plaster to keep it in place). Dead and macerated skin should be removed regularly with a nail file or nail clippers for best results. Detail Level: Detailed

## 2024-06-03 DIAGNOSIS — A60.00 RECURRENT GENITAL HSV (HERPES SIMPLEX VIRUS) INFECTION: ICD-10-CM

## 2024-06-04 RX ORDER — ACYCLOVIR 400 MG/1
400 TABLET ORAL 3 TIMES DAILY
Qty: 30 TABLET | Refills: 5 | Status: SHIPPED | OUTPATIENT
Start: 2024-06-04

## 2024-07-30 ENCOUNTER — TELEMEDICINE (OUTPATIENT)
Dept: FAMILY MEDICINE CLINIC | Age: 44
End: 2024-07-30
Payer: COMMERCIAL

## 2024-07-30 DIAGNOSIS — F90.9 ADULT ADHD: Primary | ICD-10-CM

## 2024-07-30 PROCEDURE — 99213 OFFICE O/P EST LOW 20 MIN: CPT | Performed by: STUDENT IN AN ORGANIZED HEALTH CARE EDUCATION/TRAINING PROGRAM

## 2024-07-30 RX ORDER — LISDEXAMFETAMINE DIMESYLATE 30 MG/1
30 CAPSULE ORAL DAILY
Qty: 30 CAPSULE | Refills: 0 | Status: SHIPPED | OUTPATIENT
Start: 2024-07-30 | End: 2024-08-29

## 2024-07-30 NOTE — PROGRESS NOTES
Myles Tobin (: 1980) is a 44 y.o. male is here for evaluation of the following chief complaint(s): Follow-up and Medication Refill    Assessment/Plan:   1. Adult ADHD  -     lisdexamfetamine (VYVANSE) 30 MG capsule; Take 1 capsule by mouth daily for 30 days. Max Daily Amount: 30 mg, Disp-30 capsule, R-0Normal  Chronic.  Stable.  On Vyvanse intermittently.  Needs refill.  Will provide at this time.    No follow-ups on file.  Subjective/Objective:   Since last visit: no change.  Medication compliance: Patient is taking more intermittently, only when patient has days when has absolutely needs it.  Side effects from medication include: none.   has been reviewed.         Patient-Reported Vitals: None  No data recorded     On this date 2024 I have spent 20 minutes reviewing previous notes, test results and face to face with the patient discussing the diagnosis and importance of compliance with the treatment plan as well as documenting on the day of the visit.  Myles Tobin, was evaluated through a synchronous (real-time) audio-video encounter. The patient (or guardian if applicable) is aware that this is a billable service, which includes applicable co-pays. This Virtual Visit was conducted with patient's (and/or legal guardian's) consent. Patient identification was verified, and a caregiver was present when appropriate.   The patient was located at Home: [address] [place] [id number]  Provider was located at Facility (Appt Dept): [address] [place] [address]  Confirm you are appropriately licensed, registered, or certified to deliver care in the state where the patient is located as indicated above. If you are not or unsure, please re-schedule the visit: Yes, I confirm.        An electronic signature was used to authenticate this note.  -- Peter Marroquin MD

## 2024-09-03 DIAGNOSIS — F90.9 ADULT ADHD: ICD-10-CM

## 2024-09-03 RX ORDER — LISDEXAMFETAMINE DIMESYLATE 30 MG/1
30 CAPSULE ORAL DAILY
Qty: 30 CAPSULE | Refills: 0 | Status: SHIPPED | OUTPATIENT
Start: 2024-09-03 | End: 2024-10-03

## 2024-09-03 NOTE — TELEPHONE ENCOUNTER
Patient is requesting a rx for       lisdexamfetamine (VYVANSE) 30 MG capsule   Last seen 7/30/2024    Next visit Visit date not found

## 2024-09-03 NOTE — TELEPHONE ENCOUNTER
Last Office Visit  -  07/30/2024  Next Office Visit  -  n/a    Last Filled  -  07/30/2024  Last UDS -    Contract -

## 2024-10-07 DIAGNOSIS — F90.9 ADULT ADHD: ICD-10-CM

## 2024-10-07 RX ORDER — LISDEXAMFETAMINE DIMESYLATE 30 MG/1
30 CAPSULE ORAL DAILY
Qty: 30 CAPSULE | Refills: 0 | Status: SHIPPED | OUTPATIENT
Start: 2024-10-07 | End: 2024-11-06

## 2024-10-07 NOTE — TELEPHONE ENCOUNTER
Last Office Visit  -  7/30/24  Next Office Visit  -  n/a    Last Filled  -  9/3/24  Last UDS -  n/a  Contract -  n/a

## 2025-01-20 DIAGNOSIS — F90.9 ADULT ADHD: ICD-10-CM

## 2025-01-20 NOTE — TELEPHONE ENCOUNTER
Last Office Visit  -  7/30/24  Next Office Visit  -  1/21/25    Last Filled  -  10/7/24  Last UDS -  n/a  Contract -  n/a

## 2025-01-21 ENCOUNTER — OFFICE VISIT (OUTPATIENT)
Dept: FAMILY MEDICINE CLINIC | Age: 45
End: 2025-01-21
Payer: COMMERCIAL

## 2025-01-21 VITALS
OXYGEN SATURATION: 98 % | HEIGHT: 68 IN | BODY MASS INDEX: 31.95 KG/M2 | SYSTOLIC BLOOD PRESSURE: 122 MMHG | DIASTOLIC BLOOD PRESSURE: 70 MMHG | HEART RATE: 61 BPM | WEIGHT: 210.8 LBS

## 2025-01-21 DIAGNOSIS — F90.9 ADULT ADHD: Primary | ICD-10-CM

## 2025-01-21 PROBLEM — S53.32XA: Status: ACTIVE | Noted: 2023-03-15

## 2025-01-21 PROCEDURE — 99213 OFFICE O/P EST LOW 20 MIN: CPT | Performed by: STUDENT IN AN ORGANIZED HEALTH CARE EDUCATION/TRAINING PROGRAM

## 2025-01-21 RX ORDER — LISDEXAMFETAMINE DIMESYLATE 30 MG/1
30 CAPSULE ORAL DAILY
Qty: 30 CAPSULE | Refills: 0 | Status: SHIPPED | OUTPATIENT
Start: 2025-01-21 | End: 2025-02-20

## 2025-01-21 RX ORDER — LISDEXAMFETAMINE DIMESYLATE 30 MG/1
30 CAPSULE ORAL DAILY
Qty: 30 CAPSULE | Refills: 0 | Status: SHIPPED | OUTPATIENT
Start: 2025-01-21 | End: 2025-01-21 | Stop reason: SDUPTHER

## 2025-01-21 SDOH — ECONOMIC STABILITY: FOOD INSECURITY: WITHIN THE PAST 12 MONTHS, YOU WORRIED THAT YOUR FOOD WOULD RUN OUT BEFORE YOU GOT MONEY TO BUY MORE.: NEVER TRUE

## 2025-01-21 SDOH — ECONOMIC STABILITY: FOOD INSECURITY: WITHIN THE PAST 12 MONTHS, THE FOOD YOU BOUGHT JUST DIDN'T LAST AND YOU DIDN'T HAVE MONEY TO GET MORE.: NEVER TRUE

## 2025-01-21 ASSESSMENT — PATIENT HEALTH QUESTIONNAIRE - PHQ9
2. FEELING DOWN, DEPRESSED OR HOPELESS: NOT AT ALL
SUM OF ALL RESPONSES TO PHQ QUESTIONS 1-9: 0
SUM OF ALL RESPONSES TO PHQ9 QUESTIONS 1 & 2: 0
SUM OF ALL RESPONSES TO PHQ QUESTIONS 1-9: 0
SUM OF ALL RESPONSES TO PHQ QUESTIONS 1-9: 0
1. LITTLE INTEREST OR PLEASURE IN DOING THINGS: NOT AT ALL
SUM OF ALL RESPONSES TO PHQ QUESTIONS 1-9: 0

## 2025-01-21 NOTE — ASSESSMENT & PLAN NOTE
Chronic.  Stable.  On Vyvanse.  Needs refill.  Will provide at this time.    Orders:    lisdexamfetamine (VYVANSE) 30 MG capsule; Take 1 capsule by mouth daily for 30 days. Max Daily Amount: 30 mg

## 2025-01-21 NOTE — PROGRESS NOTES
Myles Tobin (: 1980) is a 44 y.o. male is here for evaluation of the following chief complaint(s): Follow-up and Medication Check    Assessment/Plan:     Assessment & Plan  Adult ADHD  Chronic.  Stable.  On Vyvanse.  Needs refill.  Will provide at this time.    Orders:    lisdexamfetamine (VYVANSE) 30 MG capsule; Take 1 capsule by mouth daily for 30 days. Max Daily Amount: 30 mg    No follow-ups on file.  Subjective/Objective:   Since last visit: no change.  Medication compliance: Takes intermittently.  Side effects from medication include: none.   has been reviewed.        /70   Pulse 61   Ht 1.727 m (5' 8\")   Wt 95.6 kg (210 lb 12.8 oz)   SpO2 98%   BMI 32.05 kg/m²       An electronic signature was used to authenticate this note.  -- Peter Marroquin MD

## 2025-03-13 DIAGNOSIS — F90.9 ADULT ADHD: ICD-10-CM

## 2025-03-13 RX ORDER — LISDEXAMFETAMINE DIMESYLATE 30 MG/1
30 CAPSULE ORAL DAILY
Qty: 30 CAPSULE | Refills: 0 | Status: SHIPPED | OUTPATIENT
Start: 2025-03-13 | End: 2025-04-12

## 2025-03-13 NOTE — TELEPHONE ENCOUNTER
Last Office Visit  -  1/21/25  Next Office Visit  -  n/a    Last Filled  -  1/21/25  Last UDS -    Contract -

## 2025-04-25 DIAGNOSIS — F90.9 ADULT ADHD: ICD-10-CM

## 2025-04-25 RX ORDER — LISDEXAMFETAMINE DIMESYLATE 30 MG/1
30 CAPSULE ORAL DAILY
Qty: 30 CAPSULE | Refills: 0 | Status: SHIPPED | OUTPATIENT
Start: 2025-04-25 | End: 2025-05-25

## 2025-05-29 ENCOUNTER — OFFICE VISIT (OUTPATIENT)
Dept: FAMILY MEDICINE CLINIC | Age: 45
End: 2025-05-29
Payer: COMMERCIAL

## 2025-05-29 VITALS
SYSTOLIC BLOOD PRESSURE: 110 MMHG | WEIGHT: 191.8 LBS | HEIGHT: 68 IN | HEART RATE: 73 BPM | DIASTOLIC BLOOD PRESSURE: 64 MMHG | BODY MASS INDEX: 29.07 KG/M2 | OXYGEN SATURATION: 98 %

## 2025-05-29 DIAGNOSIS — A60.00 RECURRENT GENITAL HSV (HERPES SIMPLEX VIRUS) INFECTION: ICD-10-CM

## 2025-05-29 DIAGNOSIS — F90.9 ADULT ADHD: ICD-10-CM

## 2025-05-29 DIAGNOSIS — Z00.00 WELL ADULT EXAM: Primary | ICD-10-CM

## 2025-05-29 DIAGNOSIS — E06.3 HASHIMOTO'S DISEASE: ICD-10-CM

## 2025-05-29 PROCEDURE — 99396 PREV VISIT EST AGE 40-64: CPT | Performed by: STUDENT IN AN ORGANIZED HEALTH CARE EDUCATION/TRAINING PROGRAM

## 2025-05-29 PROCEDURE — 99214 OFFICE O/P EST MOD 30 MIN: CPT | Performed by: STUDENT IN AN ORGANIZED HEALTH CARE EDUCATION/TRAINING PROGRAM

## 2025-05-29 RX ORDER — MAGNESIUM GLYCINATE 100 MG
CAPSULE ORAL
COMMUNITY

## 2025-05-29 RX ORDER — LISDEXAMFETAMINE DIMESYLATE 40 MG/1
40 CAPSULE ORAL DAILY
Qty: 30 CAPSULE | Refills: 0 | Status: SHIPPED | OUTPATIENT
Start: 2025-05-29 | End: 2025-06-28

## 2025-05-29 NOTE — PROGRESS NOTES
[facility]  Establish care visit   2025    Myles Tobin (:  1980) is a 45 y.o. male, here to establish care.    Chief Complaint   Patient presents with    Medication Refill        ASSESSMENT/ PLAN  1. Well adult exam  General wellness exam. Reviewed chart for past hx and updated today. Counseled on age appropriate health guidance and discussed screening recommendations. Vaccinations reviewed and discussed. All questions answered.  - Comprehensive Metabolic Panel; Future  - Lipid, Fasting; Future  - Hemoglobin A1C; Future  - TSH reflex to FT4; Future    2. Hashimoto's disease  Chronic.  Stable.  Not on medication.  Will order lab at this time.  - TSH reflex to FT4; Future    3. Adult ADHD  Chronic.  Partially controlled.  PDMP reviewed.  Will increase Vyvanse to 40 mg daily.  Follow-up in 3 months.  - Lisdexamfetamine Dimesylate (VYVANSE) 40 MG CAPS; Take 40 mg by mouth daily for 30 days. Max Daily Amount: 40 mg  Dispense: 30 capsule; Refill: 0    4. Recurrent genital HSV (herpes simplex virus) infection  Chronic.  Stable.  On intermittent acyclovir.  Does not need a refill.  Continue at this time.       No follow-ups on file.    HPI  Patient is a 45-year-old male, who presents to clinic for a well visit and for interval follow-up of chronic condition management.    Patient does not have any allergies to any medications and current medications and prescriptions are reviewed below.  Patient does follow a healthy diet in general, eats from all major food groups.  Patient exercises regularly with weight tracking on a daily basis with walking, bicycling and a stair climber for cardiovascular exercise as well.  Patient is up-to-date on childhood vaccines and boosters including his tetanus booster, which he reports that he got in 2018 even though we do not have documentation of this.  Patient does not use any tobacco products, drinks alcohol rarely, and does not use any drugs.  He is due

## 2025-06-04 DIAGNOSIS — E06.3 HASHIMOTO'S DISEASE: ICD-10-CM

## 2025-06-04 DIAGNOSIS — Z00.00 WELL ADULT EXAM: ICD-10-CM

## 2025-06-04 LAB
ALBUMIN SERPL-MCNC: 4.7 G/DL (ref 3.4–5)
ALBUMIN/GLOB SERPL: 2 {RATIO} (ref 1.1–2.2)
ALP SERPL-CCNC: 75 U/L (ref 40–129)
ALT SERPL-CCNC: 43 U/L (ref 10–40)
ANION GAP SERPL CALCULATED.3IONS-SCNC: 9 MMOL/L (ref 3–16)
AST SERPL-CCNC: 32 U/L (ref 15–37)
BILIRUB SERPL-MCNC: 0.6 MG/DL (ref 0–1)
BUN SERPL-MCNC: 22 MG/DL (ref 7–20)
CALCIUM SERPL-MCNC: 9.7 MG/DL (ref 8.3–10.6)
CHLORIDE SERPL-SCNC: 108 MMOL/L (ref 99–110)
CHOLEST SERPL-MCNC: 179 MG/DL (ref 0–199)
CO2 SERPL-SCNC: 25 MMOL/L (ref 21–32)
CREAT SERPL-MCNC: 1.3 MG/DL (ref 0.9–1.3)
EST. AVERAGE GLUCOSE BLD GHB EST-MCNC: 91.1 MG/DL
GFR SERPLBLD CREATININE-BSD FMLA CKD-EPI: 69 ML/MIN/{1.73_M2}
GLUCOSE SERPL-MCNC: 88 MG/DL (ref 70–99)
HBA1C MFR BLD: 4.8 %
HDLC SERPL-MCNC: 22 MG/DL (ref 40–60)
LDL CHOLESTEROL: 139 MG/DL
POTASSIUM SERPL-SCNC: 4.8 MMOL/L (ref 3.5–5.1)
PROT SERPL-MCNC: 7 G/DL (ref 6.4–8.2)
SODIUM SERPL-SCNC: 142 MMOL/L (ref 136–145)
TRIGL SERPL-MCNC: 92 MG/DL (ref 0–150)
TSH SERPL DL<=0.005 MIU/L-ACNC: 1.21 UIU/ML (ref 0.27–4.2)
VLDLC SERPL CALC-MCNC: 18 MG/DL

## 2025-06-05 ENCOUNTER — RESULTS FOLLOW-UP (OUTPATIENT)
Dept: FAMILY MEDICINE CLINIC | Age: 45
End: 2025-06-05

## 2025-06-05 DIAGNOSIS — E06.3 HASHIMOTO'S DISEASE: Primary | ICD-10-CM

## 2025-06-18 DIAGNOSIS — E06.3 HASHIMOTO'S DISEASE: ICD-10-CM

## 2025-06-18 LAB — THYROPEROXIDASE AB SERPL IA-ACNC: 113 IU/ML

## 2025-06-19 ENCOUNTER — RESULTS FOLLOW-UP (OUTPATIENT)
Dept: FAMILY MEDICINE CLINIC | Age: 45
End: 2025-06-19

## 2025-07-09 ENCOUNTER — TELEPHONE (OUTPATIENT)
Dept: FAMILY MEDICINE CLINIC | Age: 45
End: 2025-07-09

## 2025-07-09 DIAGNOSIS — F90.9 ADULT ADHD: ICD-10-CM

## 2025-07-09 RX ORDER — LISDEXAMFETAMINE DIMESYLATE 40 MG/1
40 CAPSULE ORAL DAILY
Qty: 30 CAPSULE | Refills: 0 | Status: SHIPPED | OUTPATIENT
Start: 2025-07-09 | End: 2025-08-08

## 2025-07-09 NOTE — TELEPHONE ENCOUNTER
Last Office Visit  -  05/29/2025  Next Office Visit  -  n/a    Last Filled  -  05/29/2025  Last UDS -    Contract -

## 2025-07-09 NOTE — TELEPHONE ENCOUNTER
Patient contacted the office req a refill on  Lisdexamfetamine Dimesylate (VYVANSE) 40 MG CAPS   Last visit 5/29/25  No future  [facility]

## 2025-08-04 ENCOUNTER — PATIENT MESSAGE (OUTPATIENT)
Dept: FAMILY MEDICINE CLINIC | Age: 45
End: 2025-08-04

## 2025-08-12 ENCOUNTER — OFFICE VISIT (OUTPATIENT)
Dept: FAMILY MEDICINE CLINIC | Age: 45
End: 2025-08-12
Payer: COMMERCIAL

## 2025-08-12 VITALS
RESPIRATION RATE: 14 BRPM | WEIGHT: 178 LBS | SYSTOLIC BLOOD PRESSURE: 98 MMHG | DIASTOLIC BLOOD PRESSURE: 58 MMHG | HEIGHT: 68 IN | OXYGEN SATURATION: 98 % | HEART RATE: 71 BPM | BODY MASS INDEX: 26.98 KG/M2

## 2025-08-12 DIAGNOSIS — L23.7 POISON IVY DERMATITIS: Primary | ICD-10-CM

## 2025-08-12 DIAGNOSIS — F90.9 ADULT ADHD: ICD-10-CM

## 2025-08-12 PROCEDURE — 99213 OFFICE O/P EST LOW 20 MIN: CPT | Performed by: STUDENT IN AN ORGANIZED HEALTH CARE EDUCATION/TRAINING PROGRAM

## 2025-08-12 RX ORDER — METHYLPREDNISOLONE 4 MG/1
TABLET ORAL
Qty: 1 KIT | Refills: 0 | Status: SHIPPED | OUTPATIENT
Start: 2025-08-12

## 2025-08-12 RX ORDER — LISDEXAMFETAMINE DIMESYLATE 40 MG/1
40 CAPSULE ORAL DAILY
Qty: 30 CAPSULE | Refills: 0 | Status: SHIPPED | OUTPATIENT
Start: 2025-08-12 | End: 2025-09-11

## 2025-08-12 ASSESSMENT — ENCOUNTER SYMPTOMS
SHORTNESS OF BREATH: 0
COUGH: 0
DIARRHEA: 0
CONSTIPATION: 0

## 2025-08-18 DIAGNOSIS — A60.00 RECURRENT GENITAL HSV (HERPES SIMPLEX VIRUS) INFECTION: ICD-10-CM

## 2025-08-18 RX ORDER — ACYCLOVIR 400 MG/1
400 TABLET ORAL 3 TIMES DAILY
Qty: 30 TABLET | Refills: 0 | Status: SHIPPED | OUTPATIENT
Start: 2025-08-18

## (undated) DEVICE — COTTON UNDERCAST PADDING,CRIMPED FINISH: Brand: WEBRIL

## (undated) DEVICE — BLADE OPHTH 180DEG CUT SURF BLU STR SHRP DBL BVL GRINDLESS

## (undated) DEVICE — GLOVE SURG SZ 7 L12IN FNGR THK94MIL STD WHT ISOLEX LTX FREE

## (undated) DEVICE — SOLUTION,SALINE,IRRGATION,500ML,STRL: Brand: MEDLINE

## (undated) DEVICE — COTTON UNDERCAST PADDING,REGULAR FINISH: Brand: WEBRIL

## (undated) DEVICE — SOLUTION IV 1000ML 0.9% SOD CHL FOR IRRIG PLAS CONT

## (undated) DEVICE — SUTURE PROL SZ 6-0 L18IN NONABSORBABLE BLU P-3 L13MM 3/8 8695G

## (undated) DEVICE — STERILE LATEX POWDER-FREE SURGICAL GLOVESWITH NITRILE COATING: Brand: PROTEXIS

## (undated) DEVICE — SYRINGE MED 10ML LUERLOCK TIP W/O SFTY DISP

## (undated) DEVICE — SUTURE ETHLN SZ 4-0 L18IN NONABSORBABLE BLK L19MM PS-2 3/8 1667H

## (undated) DEVICE — NEEDLE HYPO 18GA L1.5IN THN WALL PIVOTING SHLD BVL ORIENTED

## (undated) DEVICE — NEEDLE HYPO 22GA L1 1/2IN PIVOTING SHLD FOR LUERLOCK SYR

## (undated) DEVICE — Device

## (undated) DEVICE — Z CONVERTED USE 2273163 BANDAGE COMPR W3INXL4.5YD LTWT E EC SGL LAYERED CLP CLSR